# Patient Record
Sex: MALE | Race: ASIAN | NOT HISPANIC OR LATINO | URBAN - METROPOLITAN AREA
[De-identification: names, ages, dates, MRNs, and addresses within clinical notes are randomized per-mention and may not be internally consistent; named-entity substitution may affect disease eponyms.]

---

## 2024-06-17 LAB — HBA1C MFR BLD HPLC: 5.3 %

## 2024-07-08 ENCOUNTER — OFFICE VISIT (OUTPATIENT)
Dept: GASTROENTEROLOGY | Facility: CLINIC | Age: 44
End: 2024-07-08
Payer: COMMERCIAL

## 2024-07-08 ENCOUNTER — TELEPHONE (OUTPATIENT)
Dept: GASTROENTEROLOGY | Facility: CLINIC | Age: 44
End: 2024-07-08

## 2024-07-08 VITALS
WEIGHT: 172 LBS | SYSTOLIC BLOOD PRESSURE: 120 MMHG | HEIGHT: 65 IN | BODY MASS INDEX: 28.66 KG/M2 | DIASTOLIC BLOOD PRESSURE: 78 MMHG

## 2024-07-08 DIAGNOSIS — K21.9 GASTROESOPHAGEAL REFLUX DISEASE, UNSPECIFIED WHETHER ESOPHAGITIS PRESENT: Primary | ICD-10-CM

## 2024-07-08 PROCEDURE — 99203 OFFICE O/P NEW LOW 30 MIN: CPT | Performed by: NURSE PRACTITIONER

## 2024-07-08 RX ORDER — FAMOTIDINE 40 MG/1
40 TABLET, FILM COATED ORAL
Qty: 30 TABLET | Refills: 6 | Status: SHIPPED | OUTPATIENT
Start: 2024-07-08

## 2024-07-08 RX ORDER — PANTOPRAZOLE SODIUM 20 MG/1
20 TABLET, DELAYED RELEASE ORAL DAILY
COMMUNITY
End: 2024-07-08 | Stop reason: ALTCHOICE

## 2024-07-08 RX ORDER — OMEPRAZOLE 40 MG/1
40 CAPSULE, DELAYED RELEASE ORAL
Qty: 60 CAPSULE | Refills: 6 | Status: SHIPPED | OUTPATIENT
Start: 2024-07-08

## 2024-07-08 NOTE — H&P (VIEW-ONLY)
[facility] - Outpatient Consultation  Jose Reyes 43 y.o. male MRN: [id number]  Encounter: [id number]    ASSESSMENT AND PLAN:      1. Gastroesophageal reflux disease, unspecified whether esophagitis present  Patient states he has been having increased acid reflux symptoms for a number of years.  Patient states he has done numerous over-the-counter medications.  He states he has also done omeprazole 20 mg without relief and switch to pantoprazole 20 mg daily by PCP.  Patient notes increased sour taste in his mouth no matter what his dietary discretion has been.  Does note epigastric discomfort with palpation.  Patient does also note nocturnal acid reflux symptoms and states he makes every attempt to not eat 2 to 3 hours prior to bedtime.  Consider peptic versus gastric ulcer, GERD, hiatal hernia.  Maximum suppression for acid reflux and will reevaluate after procedure.     -Monitor for food triggers  -No eating 2 to 3 hours prior to bedtime  -Elevation head of bed  - omeprazole (PriLOSEC) 40 MG capsule; Take 1 capsule (40 mg total) by mouth 2 (two) times a day before meals  Dispense: 60 capsule; Refill: 6  - famotidine (PEPCID) 40 MG tablet; Take 1 tablet (40 mg total) by mouth daily at bedtime  Dispense: 30 tablet; Refill: 6    -Monitor for food triggers  - EGD scheduled at [facility]      Followup Appointment: After EGD with Dr. Templeton  ______________________________________________________________________    Chief Complaint   Patient presents with    GERD     Also has a sour taste in his mouth         HPI:   43-year-old male with no significant past medical history presents with long time complaint of symptoms of acid reflux, sour taste in his mouth and epigastric discomfort.  Patient states he has tried many over-the-counter medications.  States that he had also taken omeprazole 20 mg and pantoprazole 20 mg as prescribed by his PCP without relief.  Patient states he has adjusted  "his diet however feels it has not resolved any of his symptoms.  On exam, he denies nausea or vomiting however does note epigastric tenderness with palpation.  States he is having daily normal bowel movements.  Denies hematochezia or melena.  States his weight is stable.  Non-smoker, denies EtOH use.  Has had recent lab work noting negative H. pylori testing, CBC, CMP normal, lipids noted slightly elevated triglycerides.    Historical Information   Past Medical History:   Diagnosis Date    GERD (gastroesophageal reflux disease)      Past Surgical History:   Procedure Laterality Date    KNEE SURGERY      SINUS SURGERY       Social History     Substance and Sexual Activity   Alcohol Use Not Currently     Social History     Substance and Sexual Activity   Drug Use Not on file     Social History     Tobacco Use   Smoking Status Never   Smokeless Tobacco Never     Family History   Problem Relation Age of Onset    Prostate cancer Father     Colon cancer Neg Hx     Colon polyps Neg Hx        Meds/Allergies     Current Outpatient Medications:     famotidine (PEPCID) 40 MG tablet    omeprazole (PriLOSEC) 40 MG capsule    No Known Allergies    PHYSICAL EXAM:    Blood pressure 120/78, height 5' 5\" (1.651 m), weight 78 kg (172 lb). Body mass index is 28.62 kg/m².    General Appearance: NAD, cooperative, alert  Eyes: Anicteric, PERRLA, EOMI  ENT:  Normocephalic, atraumatic, normal mucosa.    Neck:  Supple, symmetrical, trachea midline,   Resp:  Clear to auscultation bilaterally; no rales, rhonchi or wheezing; respirations unlabored   CV:  S1 S2, Regular rate and rhythm; no murmur, rub, or gallop.  GI:  Soft, epigastric discomfort with palpation, non-distended; normal bowel sounds; no masses, no organomegaly   Rectal: Deferred  Musculoskeletal: No cyanosis, clubbing or edema. Normal ROM.  Skin:  No jaundice, rashes, or lesions   Heme/Lymph: No palpable cervical lymphadenopathy  Psych: Normal affect, good eye contact  Neuro: No " "gross deficits, AAOx3    Lab Results:   No results found for: \"WBC\", \"HGB\", \"HCT\", \"MCV\", \"PLT\"  No results found for: \"NA\", \"K\", \"CL\", \"CO2\", \"ANIONGAP\", \"BUN\", \"CREATININE\", \"GLUCOSE\", \"GLUF\", \"CALCIUM\", \"CORRECTEDCA\", \"AST\", \"ALT\", \"ALKPHOS\", \"PROT\", \"BILITOT\", \"EGFR\"  No results found for: \"IRON\", \"TIBC\", \"FERRITIN\"  No results found for: \"LIPASE\"    Radiology Results:   No results found.      REVIEW OF SYSTEMS:    CONSTITUTIONAL: Denies any fever, chills, rigors, and weight loss.  HEENT: No earache or tinnitus. Denies hearing loss or visual disturbances.  CARDIOVASCULAR: No chest pain or palpitations.   RESPIRATORY: Denies any cough, hemoptysis, shortness of breath or dyspnea on exertion.  GASTROINTESTINAL: As noted in the History of Present Illness.   GENITOURINARY: No problems with urination. Denies any hematuria or dysuria.  NEUROLOGIC: No dizziness or vertigo, denies headaches.   MUSCULOSKELETAL: Denies any muscle or joint pain.   SKIN: Denies skin rashes or itching.   ENDOCRINE: Denies excessive thirst. Denies intolerance to heat or cold.  PSYCHOSOCIAL: Denies depression or anxiety. Denies any recent memory loss.   "

## 2024-07-08 NOTE — TELEPHONE ENCOUNTER
Scheduled date of EGD(as of today): 7/25/24  Physician performing EGD: ANNE  Location of EGD: [facility]  Instructions reviewed with patient by: MANJULA  Clearances: N

## 2024-07-08 NOTE — PROGRESS NOTES
[facility] - Outpatient Consultation  Jose Reyes 43 y.o. male MRN: [id number]  Encounter: [id number]    ASSESSMENT AND PLAN:      1. Gastroesophageal reflux disease, unspecified whether esophagitis present  Patient states he has been having increased acid reflux symptoms for a number of years.  Patient states he has done numerous over-the-counter medications.  He states he has also done omeprazole 20 mg without relief and switch to pantoprazole 20 mg daily by PCP.  Patient notes increased sour taste in his mouth no matter what his dietary discretion has been.  Does note epigastric discomfort with palpation.  Patient does also note nocturnal acid reflux symptoms and states he makes every attempt to not eat 2 to 3 hours prior to bedtime.  Consider peptic versus gastric ulcer, GERD, hiatal hernia.  Maximum suppression for acid reflux and will reevaluate after procedure.     -Monitor for food triggers  -No eating 2 to 3 hours prior to bedtime  -Elevation head of bed  - omeprazole (PriLOSEC) 40 MG capsule; Take 1 capsule (40 mg total) by mouth 2 (two) times a day before meals  Dispense: 60 capsule; Refill: 6  - famotidine (PEPCID) 40 MG tablet; Take 1 tablet (40 mg total) by mouth daily at bedtime  Dispense: 30 tablet; Refill: 6    -Monitor for food triggers  - EGD scheduled at [facility]      Followup Appointment: After EGD with Dr. Templeton  ______________________________________________________________________    Chief Complaint   Patient presents with    GERD     Also has a sour taste in his mouth         HPI:   43-year-old male with no significant past medical history presents with long time complaint of symptoms of acid reflux, sour taste in his mouth and epigastric discomfort.  Patient states he has tried many over-the-counter medications.  States that he had also taken omeprazole 20 mg and pantoprazole 20 mg as prescribed by his PCP without relief.  Patient states he has adjusted  "his diet however feels it has not resolved any of his symptoms.  On exam, he denies nausea or vomiting however does note epigastric tenderness with palpation.  States he is having daily normal bowel movements.  Denies hematochezia or melena.  States his weight is stable.  Non-smoker, denies EtOH use.  Has had recent lab work noting negative H. pylori testing, CBC, CMP normal, lipids noted slightly elevated triglycerides.    Historical Information   Past Medical History:   Diagnosis Date    GERD (gastroesophageal reflux disease)      Past Surgical History:   Procedure Laterality Date    KNEE SURGERY      SINUS SURGERY       Social History     Substance and Sexual Activity   Alcohol Use Not Currently     Social History     Substance and Sexual Activity   Drug Use Not on file     Social History     Tobacco Use   Smoking Status Never   Smokeless Tobacco Never     Family History   Problem Relation Age of Onset    Prostate cancer Father     Colon cancer Neg Hx     Colon polyps Neg Hx        Meds/Allergies     Current Outpatient Medications:     famotidine (PEPCID) 40 MG tablet    omeprazole (PriLOSEC) 40 MG capsule    No Known Allergies    PHYSICAL EXAM:    Blood pressure 120/78, height 5' 5\" (1.651 m), weight 78 kg (172 lb). Body mass index is 28.62 kg/m².    General Appearance: NAD, cooperative, alert  Eyes: Anicteric, PERRLA, EOMI  ENT:  Normocephalic, atraumatic, normal mucosa.    Neck:  Supple, symmetrical, trachea midline,   Resp:  Clear to auscultation bilaterally; no rales, rhonchi or wheezing; respirations unlabored   CV:  S1 S2, Regular rate and rhythm; no murmur, rub, or gallop.  GI:  Soft, epigastric discomfort with palpation, non-distended; normal bowel sounds; no masses, no organomegaly   Rectal: Deferred  Musculoskeletal: No cyanosis, clubbing or edema. Normal ROM.  Skin:  No jaundice, rashes, or lesions   Heme/Lymph: No palpable cervical lymphadenopathy  Psych: Normal affect, good eye contact  Neuro: No " "gross deficits, AAOx3    Lab Results:   No results found for: \"WBC\", \"HGB\", \"HCT\", \"MCV\", \"PLT\"  No results found for: \"NA\", \"K\", \"CL\", \"CO2\", \"ANIONGAP\", \"BUN\", \"CREATININE\", \"GLUCOSE\", \"GLUF\", \"CALCIUM\", \"CORRECTEDCA\", \"AST\", \"ALT\", \"ALKPHOS\", \"PROT\", \"BILITOT\", \"EGFR\"  No results found for: \"IRON\", \"TIBC\", \"FERRITIN\"  No results found for: \"LIPASE\"    Radiology Results:   No results found.      REVIEW OF SYSTEMS:    CONSTITUTIONAL: Denies any fever, chills, rigors, and weight loss.  HEENT: No earache or tinnitus. Denies hearing loss or visual disturbances.  CARDIOVASCULAR: No chest pain or palpitations.   RESPIRATORY: Denies any cough, hemoptysis, shortness of breath or dyspnea on exertion.  GASTROINTESTINAL: As noted in the History of Present Illness.   GENITOURINARY: No problems with urination. Denies any hematuria or dysuria.  NEUROLOGIC: No dizziness or vertigo, denies headaches.   MUSCULOSKELETAL: Denies any muscle or joint pain.   SKIN: Denies skin rashes or itching.   ENDOCRINE: Denies excessive thirst. Denies intolerance to heat or cold.  PSYCHOSOCIAL: Denies depression or anxiety. Denies any recent memory loss.   "

## 2024-07-11 ENCOUNTER — ANESTHESIA (OUTPATIENT)
Dept: ANESTHESIOLOGY | Facility: AMBULATORY SURGERY CENTER | Age: 44
End: 2024-07-11

## 2024-07-11 ENCOUNTER — ANESTHESIA EVENT (OUTPATIENT)
Dept: ANESTHESIOLOGY | Facility: AMBULATORY SURGERY CENTER | Age: 44
End: 2024-07-11

## 2024-07-17 ENCOUNTER — TELEPHONE (OUTPATIENT)
Dept: GASTROENTEROLOGY | Facility: CLINIC | Age: 44
End: 2024-07-17

## 2024-07-23 ENCOUNTER — TELEPHONE (OUTPATIENT)
Dept: GASTROENTEROLOGY | Facility: AMBULATORY SURGERY CENTER | Age: 44
End: 2024-07-23

## 2024-07-25 ENCOUNTER — HOSPITAL ENCOUNTER (OUTPATIENT)
Dept: GASTROENTEROLOGY | Facility: AMBULATORY SURGERY CENTER | Age: 44
Discharge: HOME | End: 2024-07-25
Payer: COMMERCIAL

## 2024-07-25 ENCOUNTER — ANESTHESIA EVENT (OUTPATIENT)
Dept: GASTROENTEROLOGY | Facility: AMBULATORY SURGERY CENTER | Age: 44
End: 2024-07-25

## 2024-07-25 ENCOUNTER — ANESTHESIA (OUTPATIENT)
Dept: GASTROENTEROLOGY | Facility: AMBULATORY SURGERY CENTER | Age: 44
End: 2024-07-25

## 2024-07-25 VITALS
SYSTOLIC BLOOD PRESSURE: 99 MMHG | WEIGHT: 172 LBS | DIASTOLIC BLOOD PRESSURE: 52 MMHG | OXYGEN SATURATION: 95 % | TEMPERATURE: 97.8 F | HEART RATE: 65 BPM | RESPIRATION RATE: 15 BRPM | BODY MASS INDEX: 28.66 KG/M2 | HEIGHT: 65 IN

## 2024-07-25 DIAGNOSIS — K21.9 GASTROESOPHAGEAL REFLUX DISEASE, UNSPECIFIED WHETHER ESOPHAGITIS PRESENT: ICD-10-CM

## 2024-07-25 PROCEDURE — 88305 TISSUE EXAM BY PATHOLOGIST: CPT | Performed by: STUDENT IN AN ORGANIZED HEALTH CARE EDUCATION/TRAINING PROGRAM

## 2024-07-25 PROCEDURE — 43239 EGD BIOPSY SINGLE/MULTIPLE: CPT | Performed by: INTERNAL MEDICINE

## 2024-07-25 RX ORDER — PROPOFOL 10 MG/ML
INJECTION, EMULSION INTRAVENOUS AS NEEDED
Status: DISCONTINUED | OUTPATIENT
Start: 2024-07-25 | End: 2024-07-25

## 2024-07-25 RX ORDER — SODIUM CHLORIDE, SODIUM LACTATE, POTASSIUM CHLORIDE, CALCIUM CHLORIDE 600; 310; 30; 20 MG/100ML; MG/100ML; MG/100ML; MG/100ML
50 INJECTION, SOLUTION INTRAVENOUS CONTINUOUS
Status: DISCONTINUED | OUTPATIENT
Start: 2024-07-25 | End: 2024-07-25

## 2024-07-25 RX ORDER — LIDOCAINE HYDROCHLORIDE 10 MG/ML
INJECTION, SOLUTION EPIDURAL; INFILTRATION; INTRACAUDAL; PERINEURAL AS NEEDED
Status: DISCONTINUED | OUTPATIENT
Start: 2024-07-25 | End: 2024-07-25

## 2024-07-25 RX ADMIN — PROPOFOL 130 MG: 10 INJECTION, EMULSION INTRAVENOUS at 14:45

## 2024-07-25 RX ADMIN — PROPOFOL 50 MG: 10 INJECTION, EMULSION INTRAVENOUS at 14:47

## 2024-07-25 RX ADMIN — PROPOFOL 50 MG: 10 INJECTION, EMULSION INTRAVENOUS at 14:48

## 2024-07-25 RX ADMIN — LIDOCAINE HYDROCHLORIDE 50 MG: 10 INJECTION, SOLUTION EPIDURAL; INFILTRATION; INTRACAUDAL; PERINEURAL at 14:45

## 2024-07-25 RX ADMIN — SODIUM CHLORIDE, SODIUM LACTATE, POTASSIUM CHLORIDE, CALCIUM CHLORIDE 50 ML/HR: 600; 310; 30; 20 INJECTION, SOLUTION INTRAVENOUS at 14:37

## 2024-07-25 NOTE — ANESTHESIA PREPROCEDURE EVALUATION
Procedure:  EGD    Relevant Problems   ANESTHESIA (within normal limits)      GI/HEPATIC   (+) GERD (gastroesophageal reflux disease)        Physical Exam    Airway    Mallampati score: I  TM Distance: >3 FB  Neck ROM: full     Dental   No notable dental hx     Cardiovascular  Cardiovascular exam normal    Pulmonary  Pulmonary exam normal     Other Findings        Anesthesia Plan  ASA Score- 1     Anesthesia Type- IV sedation with anesthesia with ASA Monitors.         Additional Monitors:     Airway Plan:     Comment: I discussed risks (reviewed with patient on the anesthesia consent form), benefits and alternatives of monitored sedation including the possibility under sedation to have recall or mild discomfort.  .       Plan Factors-    Chart reviewed.    Patient summary reviewed.                  Induction- intravenous.    Postoperative Plan-         Informed Consent- Anesthetic plan and risks discussed with patient.  I personally reviewed this patient with the CRNA. Discussed and agreed on the Anesthesia Plan with the CRNA..

## 2024-07-25 NOTE — INTERVAL H&P NOTE
H&P reviewed. After examining the patient I find no changes in the patients condition since the H&P had been written.    Vitals:    07/25/24 1430   BP: 111/62   Pulse: 67   Resp: 20   Temp: 97.8 °F (36.6 °C)   SpO2: 94%

## 2024-07-25 NOTE — ANESTHESIA POSTPROCEDURE EVALUATION
Post-Op Assessment Note    CV Status:  Stable  Pain Score: 0    Pain management: adequate       Mental Status:  Alert, awake and sleepy   Hydration Status:  Euvolemic   PONV Controlled:  Controlled   Airway Patency:  Patent     Post Op Vitals Reviewed: Yes    No anethesia notable event occurred.    Staff: CRNA               BP   97/52   Temp   97.6   Pulse  66   Resp   18   SpO2   98

## 2024-07-29 PROCEDURE — 88305 TISSUE EXAM BY PATHOLOGIST: CPT | Performed by: STUDENT IN AN ORGANIZED HEALTH CARE EDUCATION/TRAINING PROGRAM

## 2024-08-15 ENCOUNTER — OFFICE VISIT (OUTPATIENT)
Dept: GASTROENTEROLOGY | Facility: CLINIC | Age: 44
End: 2024-08-15
Payer: COMMERCIAL

## 2024-08-15 VITALS
HEIGHT: 65 IN | DIASTOLIC BLOOD PRESSURE: 78 MMHG | SYSTOLIC BLOOD PRESSURE: 110 MMHG | WEIGHT: 172 LBS | BODY MASS INDEX: 28.66 KG/M2

## 2024-08-15 DIAGNOSIS — R12 HEARTBURN: Primary | ICD-10-CM

## 2024-08-15 PROCEDURE — 99214 OFFICE O/P EST MOD 30 MIN: CPT | Performed by: INTERNAL MEDICINE

## 2024-08-15 RX ORDER — RABEPRAZOLE SODIUM 20 MG/1
20 TABLET, DELAYED RELEASE ORAL 2 TIMES DAILY
Qty: 180 TABLET | Refills: 0 | Status: SHIPPED | OUTPATIENT
Start: 2024-08-15

## 2024-08-15 NOTE — PROGRESS NOTES
[facility] - Outpatient Follow-up Note  Jose Reyes 43 y.o. male MRN: [id number]  Encounter: [id number]    ASSESSMENT AND PLAN:      1. Heartburn  He does have the typical symptoms of heartburn and regurgitation simply not responding at all to his medications.  I will switch him over to rabeprazole, the most potent PPI.  He is interested in potential antireflux surgery, so I will proceed with esophageal manometry to ensure that his motility is intact as well as 24-hour pH impedance to confirm that his breakthrough symptoms are indeed reflux related  - RABEprazole (ACIPHEX) 20 MG tablet; Take 1 tablet (20 mg total) by mouth 2 (two) times a day  Dispense: 180 tablet; Refill: 0  - Esoph manometry/24hr ph; Future      Follow up appointment: For manometry and pH impedance    _______________________      Chief Complaint   Patient presents with    Follow-up     Pt is still experiencing burning sensation in his throat and stomach, sour taste in his mouth. Occasionally feels nauseous after eating, vomits phlegm. Medications are not helping.       HPI:   Patient is a 43 y.o. male with no significant past medical history presenting for follow up regarding continued issues with reflux.  He has breakthrough heartburn and regurgitation despite omeprazole 40 mg twice daily and famotidine 40 mg at bedtime.  He continues to deny dysphagia and odynophagia.  Recent endoscopy was normal including biopsies.  He denies early satiety and vomiting.  He does not think the medications are helping at all.  He is interested in antireflux surgery should he be a candidate    Historical Information   Past Medical History:   Diagnosis Date    GERD (gastroesophageal reflux disease)      Past Surgical History:   Procedure Laterality Date    KNEE SURGERY      SINUS SURGERY      UPPER GASTROINTESTINAL ENDOSCOPY  07/2024     Social History     Substance and Sexual Activity   Alcohol Use Not Currently     Social  "History     Substance and Sexual Activity   Drug Use Never     Social History     Tobacco Use   Smoking Status Never    Passive exposure: Never   Smokeless Tobacco Never     Family History   Problem Relation Age of Onset    Prostate cancer Father     Cancer Father         Prostate    Diabetes Maternal Grandmother     Colon cancer Neg Hx     Colon polyps Neg Hx          Current Outpatient Medications:     RABEprazole (ACIPHEX) 20 MG tablet  No Known Allergies  Reviewed medications and allergies and updated as indicated    PHYSICAL EXAM:    Blood pressure 110/78, height 5' 5\" (1.651 m), weight 78 kg (172 lb). Body mass index is 28.62 kg/m².  General Appearance: NAD, cooperative, alert  Eyes: Anicteric  GI:  Soft, non-tender, non-distended; normal bowel sounds; no masses, no organomegaly   Rectal: Deferred  Musculoskeletal: No edema.  Skin:  No jaundice    Lab Results:   No results found for: \"WBC\", \"HGB\", \"MCV\", \"PLT\"  No results found for: \"NA\", \"K\", \"CL\", \"CO2\", \"ANIONGAP\", \"BUN\", \"CREATININE\", \"GLUCOSE\", \"GLUF\", \"CALCIUM\", \"CORRECTEDCA\", \"AST\", \"ALT\", \"ALKPHOS\", \"PROT\", \"BILITOT\", \"EGFR\"  No results found for: \"IRON\", \"TIBC\", \"FERRITIN\"  No results found for: \"LIPASE\"    Radiology Results:   EGD    Result Date: 7/25/2024  Narrative: Table formatting from the original result was not included. [facility] [address] [place] [id number] [phone] DATE OF SERVICE: 7/25/24 PHYSICIAN(S): Attending: Kareem Templeton DO Fellow: No Staff Documented INDICATION: Gastroesophageal reflux disease, unspecified whether esophagitis present POST-OP DIAGNOSIS: See the impression below. PREPROCEDURE: Informed consent was obtained for the procedure, including sedation.  Risks of perforation, hemorrhage, adverse drug reaction and aspiration were discussed. The patient was placed in the left lateral decubitus position. Patient was explained about the risks and benefits of the " procedure. Risks including but not limited to bleeding, infection, and perforation were explained in detail. Also explained about less than 100% sensitivity with the exam and other alternatives. PROCEDURE: EGD DETAILS OF PROCEDURE: Patient was taken to the procedure room where a time out was performed to confirm correct patient and correct procedure. The patient underwent monitored anesthesia care, which was administered by an anesthesia professional. The patient's blood pressure, ECG and oxygen were monitored throughout the procedure. The scope was introduced through the mouth and advanced to the second part of the duodenum. Retroflexion was performed in the cardia, fundus and incisura. Prior to the procedure, the patient's H. Pylori status was unknown. The patient experienced no blood loss. The procedure was not difficult. The patient tolerated the procedure well. There were no apparent adverse events. ANESTHESIA INFORMATION: ASA: I Anesthesia Type: IV Sedation with Anesthesia MEDICATIONS: No administrations occurring from 1442 to 1450 on 07/25/24 FINDINGS: All observed locations appeared normal. Performed random biopsy using biopsy forceps to rule out H. pylori. SPECIMENS: * No specimens in log *     Impression: Normal. Performed random biopsy to rule out H. pylori. RECOMMENDATION: Await pathology results   Kareem Templeton DO

## 2024-08-16 ENCOUNTER — TELEPHONE (OUTPATIENT)
Dept: GASTROENTEROLOGY | Facility: AMBULATORY SURGERY CENTER | Age: 44
End: 2024-08-16

## 2024-08-20 ENCOUNTER — HOSPITAL ENCOUNTER (OUTPATIENT)
Dept: GASTROENTEROLOGY | Facility: AMBULATORY SURGERY CENTER | Age: 44
Discharge: HOME | End: 2024-08-20
Attending: INTERNAL MEDICINE
Payer: COMMERCIAL

## 2024-08-20 VITALS
RESPIRATION RATE: 15 BRPM | OXYGEN SATURATION: 100 % | SYSTOLIC BLOOD PRESSURE: 121 MMHG | DIASTOLIC BLOOD PRESSURE: 78 MMHG | HEART RATE: 65 BPM | TEMPERATURE: 98 F

## 2024-08-20 DIAGNOSIS — R12 HEARTBURN: ICD-10-CM

## 2024-08-20 PROCEDURE — 91010 ESOPHAGUS MOTILITY STUDY: CPT | Performed by: INTERNAL MEDICINE

## 2024-08-20 PROCEDURE — 91038 ESOPH IMPED FUNCT TEST > 1HR: CPT | Performed by: INTERNAL MEDICINE

## 2024-08-20 NOTE — PERIOPERATIVE NURSING NOTE
Patient brought in the room and explained the esophageal manometry procedure. After the confirmation of allergies, Lidocaine 2% viscous solution given via right/ left nostrils and  a transnasal insertion of the High Resolution esophageal manometry catheter was inserted via left nostril. Patient given water to drink during the insertion and once the catheter inserted pressure bands of both Upper esophageal sphincter  (UES) and Lower esophageal sphincter ( LES) were observed on the color contour. Patient instructed to take a deep breath to verify placement of the catheter, diaphragmatic pinch noted on inspiration. Catheter was secured to left cheek. Patient was assisted to supine position .Patient was instructed to relax  while acclimating the catheter for about 5 minutes. A 30 second baseline resting pressure was obtained to identify the UES and LES followed by a series of 10 liquid swallows using 5 cc room temperature normal saline to assess esophageal motility and bolus transit. Patient administered 10 viscous swallows using 5 cc viscous solution, 1 multiple rapid drink swallow using 2 cc room temperature normal saline given a total of 5 drinks. Patient instructed to sit up at the edge of the stretcher and given 5 upright liquid swallows using 5 cc room temperature normal saline and 1 rapid drink challenge using 200 cc room temperature water. At the end of the procedure the high resolution esophageal manometry catheter was removed from the nostril intact. Dual sensor PH probe inserted via left nostril and secured. Zephr recorder teachback performed and patient verbalized understanding. Patient instructed to return next day to have probe remove. Discharge instructions given and patient ambulated out of room in stable condition.

## 2024-08-23 PROCEDURE — 91038 ESOPH IMPED FUNCT TEST > 1HR: CPT | Performed by: INTERNAL MEDICINE

## 2024-08-23 PROCEDURE — 91010 ESOPHAGUS MOTILITY STUDY: CPT | Performed by: INTERNAL MEDICINE

## 2024-09-12 ENCOUNTER — TELEPHONE (OUTPATIENT)
Age: 44
End: 2024-09-12

## 2024-09-12 ENCOUNTER — NURSE TRIAGE (OUTPATIENT)
Age: 44
End: 2024-09-12

## 2024-09-12 NOTE — TELEPHONE ENCOUNTER
Pt called stated to schedule an appt. Appt is scheduled for 10- and would like to speak with someone regarding severe heartburn and reflux. Warm transferred to gi nurses for further assistance

## 2024-09-12 NOTE — TELEPHONE ENCOUNTER
Pt transferred to nurses line.     Spoke with pt via language line . He received 24 hr pH study results and manometry results today and was told it was normal but still having ongoing symptoms of heartburn. He is asking if results are normal what is he experiencing/ diagnosis and what the next steps are for him. He is having symptoms of heart burn and regurgitation and no medications are helping him.     Pt is not taking the aciphex as it is not covered by insurance and cost was $300.   He is currently on omeprazole.     OV scheduled 10/31/24         Reason for Disposition   The patient is on PPI once a day with continued epigastric discomfort, reflux, regurgitation    Protocols used: GERD

## 2024-09-17 DIAGNOSIS — K21.9 GASTROESOPHAGEAL REFLUX DISEASE, UNSPECIFIED WHETHER ESOPHAGITIS PRESENT: Primary | ICD-10-CM

## 2024-09-17 NOTE — TELEPHONE ENCOUNTER
Pt. Called back, reviewed recommendation from Dr. Templeton, pt. Interested in discussing further with surgeon regarding anti-reflux procedure, pt. Symptoms have no been controlled with medication, encouraged pt. To follow a strict bland diet and continue taking medications, pt. Would like a referral to surgery to discuss, next OV with GI in October

## 2024-09-24 NOTE — H&P (VIEW-ONLY)
Thoracic Consult  Assessment/Plan:    GERD (gastroesophageal reflux disease)  44yM w/ GERD. pH testing done on Aciphex but with symptom and reflux correlation.     His BMI is 28.6. He is a non-smoker. We discussed surgery versus further medical management.     Discussed the risks, benefits and alternatives of the surgical repair of a hiatal hernia repair with partial fundoplication. Risks include bleeding, infection, dysphagia, injury to the vagal nerves, injury to the esophagus or another viscera, and recurrence. We discussed the typical postoperative course. Most patients are discharged home the following day after surgery on a full liquid diet for a few days. After this a soft diet can be started using our Diet Sheet Recommendations. Consent was obtained in the office today for a EGD, robotic-assisted hiatal hernia reduction with partial fundoplication, possible Biologic mesh implantation, and possible gastroplasty.         Diagnoses and all orders for this visit:    Gastroesophageal reflux disease without esophagitis          I have spent a total time of 56 minutes in caring for this patient on the day of the visit/encounter including Diagnostic results, Prognosis, Risks and benefits of tx options, Instructions for management, Patient and family education, Importance of tx compliance, Risk factor reductions, Impressions, Counseling / Coordination of care, Documenting in the medical record, Reviewing / ordering tests, medicine, procedures  , and Obtaining or reviewing history  .     A  was used and on standby.     Thoracic History      Subjective:    Patient ID: Jose Reyes is a 44 y.o. male referred for evaluation of GERD. His medical history only includes a knee surgery and sinus surgery.     He states that he has had reflux pain for at least a few years. The medications used to help but no longer help. He continues to complain of severe substernal pain. He sleeps with pillows but still has  reflux pain. Limits eating before bed. He has no dysphagia but sometimes has nausea. He has an occasional cough.     Data:  The following results contain my personal interpretation and summarization.   pH Study (8/20/24): On PPI. Good correlation between reflux events and symptoms.   Manometry (8/20/24): Nml motility  EGD (7/24/24): No hiatal hernia or esophagitis described.     Meds: Aciphex     The following portions of the patient's history were reviewed and updated as appropriate: allergies, current medications, past family history, past medical history, past social history, past surgical history, and problem list.    Past Medical History:   Diagnosis Date    GERD (gastroesophageal reflux disease)       Past Surgical History:   Procedure Laterality Date    KNEE SURGERY      SINUS SURGERY      UPPER GASTROINTESTINAL ENDOSCOPY  07/2024      Family History   Problem Relation Age of Onset    Prostate cancer Father     Cancer Father         Prostate    Diabetes Maternal Grandmother     Colon cancer Neg Hx     Colon polyps Neg Hx       Social History     Socioeconomic History    Marital status:      Spouse name: Not on file    Number of children: Not on file    Years of education: Not on file    Highest education level: Not on file   Occupational History    Not on file   Tobacco Use    Smoking status: Never     Passive exposure: Never    Smokeless tobacco: Never   Vaping Use    Vaping status: Never Used   Substance and Sexual Activity    Alcohol use: Not Currently    Drug use: Never    Sexual activity: Yes     Partners: Male   Other Topics Concern    Not on file   Social History Narrative    Not on file     Social Determinants of Health     Financial Resource Strain: Not on file   Food Insecurity: Not on file   Transportation Needs: Not on file   Physical Activity: Not on file   Stress: Not on file   Social Connections: Not on file   Intimate Partner Violence: Not on file   Housing Stability: Not on file       Review of Systems   Constitutional:  Negative for chills, fatigue and fever.   HENT:  Negative for trouble swallowing and voice change.    Eyes:  Negative for photophobia and visual disturbance.   Respiratory:  Positive for cough. Negative for chest tightness and shortness of breath.    Cardiovascular:  Negative for chest pain and palpitations.   Gastrointestinal:  Positive for nausea. Negative for abdominal pain and vomiting.        Substernal pain.   Skin:  Negative for rash and wound.   Neurological:  Negative for dizziness, weakness and light-headedness.   All other systems reviewed and are negative.        Objective:   Physical Exam  Vitals reviewed.   Constitutional:       Appearance: Normal appearance. He is normal weight.   HENT:      Head: Normocephalic and atraumatic.   Cardiovascular:      Rate and Rhythm: Normal rate and regular rhythm.      Pulses: Normal pulses.      Heart sounds: Normal heart sounds.   Pulmonary:      Effort: Pulmonary effort is normal.      Breath sounds: Normal breath sounds.   Abdominal:      General: Abdomen is flat. There is no distension.      Palpations: Abdomen is soft.      Tenderness: There is no abdominal tenderness.   Musculoskeletal:      Right lower leg: No edema.      Left lower leg: No edema.   Neurological:      General: No focal deficit present.      Mental Status: He is alert and oriented to person, place, and time.   Psychiatric:         Mood and Affect: Mood normal.         Behavior: Behavior normal.         Thought Content: Thought content normal.         Judgment: Judgment normal.     There were no vitals taken for this visit.    No Chest XR results available for this patient.   No CT Chest results available for this patient.  No CT Chest,Abdomen,Pelvis results available for this patient.   No NM PET CT results available for this patient.   No Barium Swallow results available for this patient.

## 2024-09-24 NOTE — ASSESSMENT & PLAN NOTE
44yM w/ GERD. pH testing done on Aciphex but with symptom and reflux correlation.     His BMI is 28.6. He is a non-smoker. We discussed surgery versus further medical management.     Discussed the risks, benefits and alternatives of the surgical repair of a hiatal hernia repair with partial fundoplication. Risks include bleeding, infection, dysphagia, injury to the vagal nerves, injury to the esophagus or another viscera, and recurrence. We discussed the typical postoperative course. Most patients are discharged home the following day after surgery on a full liquid diet for a few days. After this a soft diet can be started using our Diet Sheet Recommendations. Consent was obtained in the office today for a EGD, robotic-assisted hiatal hernia reduction with partial fundoplication, possible Biologic mesh implantation, and possible gastroplasty.

## 2024-09-24 NOTE — PROGRESS NOTES
Discussed AREDS 2 supplements, UV protection and green leafy vegetables. Thoracic Consult  Assessment/Plan:    GERD (gastroesophageal reflux disease)  44yM w/ GERD. pH testing done on Aciphex but with symptom and reflux correlation.     His BMI is 28.6. He is a non-smoker. We discussed surgery versus further medical management.     Discussed the risks, benefits and alternatives of the surgical repair of a hiatal hernia repair with partial fundoplication. Risks include bleeding, infection, dysphagia, injury to the vagal nerves, injury to the esophagus or another viscera, and recurrence. We discussed the typical postoperative course. Most patients are discharged home the following day after surgery on a full liquid diet for a few days. After this a soft diet can be started using our Diet Sheet Recommendations. Consent was obtained in the office today for a EGD, robotic-assisted hiatal hernia reduction with partial fundoplication, possible Biologic mesh implantation, and possible gastroplasty.         Diagnoses and all orders for this visit:    Gastroesophageal reflux disease without esophagitis          I have spent a total time of 56 minutes in caring for this patient on the day of the visit/encounter including Diagnostic results, Prognosis, Risks and benefits of tx options, Instructions for management, Patient and family education, Importance of tx compliance, Risk factor reductions, Impressions, Counseling / Coordination of care, Documenting in the medical record, Reviewing / ordering tests, medicine, procedures  , and Obtaining or reviewing history  .     A  was used and on standby.     Thoracic History      Subjective:    Patient ID: Jose Reyes is a 44 y.o. male referred for evaluation of GERD. His medical history only includes a knee surgery and sinus surgery.     He states that he has had reflux pain for at least a few years. The medications used to help but no longer help. He continues to complain of severe substernal pain. He sleeps with pillows but still has  reflux pain. Limits eating before bed. He has no dysphagia but sometimes has nausea. He has an occasional cough.     Data:  The following results contain my personal interpretation and summarization.   pH Study (8/20/24): On PPI. Good correlation between reflux events and symptoms.   Manometry (8/20/24): Nml motility  EGD (7/24/24): No hiatal hernia or esophagitis described.     Meds: Aciphex     The following portions of the patient's history were reviewed and updated as appropriate: allergies, current medications, past family history, past medical history, past social history, past surgical history, and problem list.    Past Medical History:   Diagnosis Date    GERD (gastroesophageal reflux disease)       Past Surgical History:   Procedure Laterality Date    KNEE SURGERY      SINUS SURGERY      UPPER GASTROINTESTINAL ENDOSCOPY  07/2024      Family History   Problem Relation Age of Onset    Prostate cancer Father     Cancer Father         Prostate    Diabetes Maternal Grandmother     Colon cancer Neg Hx     Colon polyps Neg Hx       Social History     Socioeconomic History    Marital status:      Spouse name: Not on file    Number of children: Not on file    Years of education: Not on file    Highest education level: Not on file   Occupational History    Not on file   Tobacco Use    Smoking status: Never     Passive exposure: Never    Smokeless tobacco: Never   Vaping Use    Vaping status: Never Used   Substance and Sexual Activity    Alcohol use: Not Currently    Drug use: Never    Sexual activity: Yes     Partners: Male   Other Topics Concern    Not on file   Social History Narrative    Not on file     Social Determinants of Health     Financial Resource Strain: Not on file   Food Insecurity: Not on file   Transportation Needs: Not on file   Physical Activity: Not on file   Stress: Not on file   Social Connections: Not on file   Intimate Partner Violence: Not on file   Housing Stability: Not on file       Review of Systems   Constitutional:  Negative for chills, fatigue and fever.   HENT:  Negative for trouble swallowing and voice change.    Eyes:  Negative for photophobia and visual disturbance.   Respiratory:  Positive for cough. Negative for chest tightness and shortness of breath.    Cardiovascular:  Negative for chest pain and palpitations.   Gastrointestinal:  Positive for nausea. Negative for abdominal pain and vomiting.        Substernal pain.   Skin:  Negative for rash and wound.   Neurological:  Negative for dizziness, weakness and light-headedness.   All other systems reviewed and are negative.        Objective:   Physical Exam  Vitals reviewed.   Constitutional:       Appearance: Normal appearance. He is normal weight.   HENT:      Head: Normocephalic and atraumatic.   Cardiovascular:      Rate and Rhythm: Normal rate and regular rhythm.      Pulses: Normal pulses.      Heart sounds: Normal heart sounds.   Pulmonary:      Effort: Pulmonary effort is normal.      Breath sounds: Normal breath sounds.   Abdominal:      General: Abdomen is flat. There is no distension.      Palpations: Abdomen is soft.      Tenderness: There is no abdominal tenderness.   Musculoskeletal:      Right lower leg: No edema.      Left lower leg: No edema.   Neurological:      General: No focal deficit present.      Mental Status: He is alert and oriented to person, place, and time.   Psychiatric:         Mood and Affect: Mood normal.         Behavior: Behavior normal.         Thought Content: Thought content normal.         Judgment: Judgment normal.     There were no vitals taken for this visit.    No Chest XR results available for this patient.   No CT Chest results available for this patient.  No CT Chest,Abdomen,Pelvis results available for this patient.   No NM PET CT results available for this patient.   No Barium Swallow results available for this patient.

## 2024-09-25 ENCOUNTER — TELEPHONE (OUTPATIENT)
Dept: CARDIAC SURGERY | Facility: CLINIC | Age: 44
End: 2024-09-25

## 2024-09-25 ENCOUNTER — CONSULT (OUTPATIENT)
Dept: CARDIAC SURGERY | Facility: HOSPITAL | Age: 44
End: 2024-09-25
Payer: COMMERCIAL

## 2024-09-25 VITALS
OXYGEN SATURATION: 98 % | WEIGHT: 169 LBS | TEMPERATURE: 97.9 F | SYSTOLIC BLOOD PRESSURE: 122 MMHG | BODY MASS INDEX: 28.16 KG/M2 | HEART RATE: 70 BPM | HEIGHT: 65 IN | RESPIRATION RATE: 18 BRPM | DIASTOLIC BLOOD PRESSURE: 68 MMHG

## 2024-09-25 DIAGNOSIS — K21.9 GASTROESOPHAGEAL REFLUX DISEASE, UNSPECIFIED WHETHER ESOPHAGITIS PRESENT: ICD-10-CM

## 2024-09-25 DIAGNOSIS — K21.9 GASTROESOPHAGEAL REFLUX DISEASE WITHOUT ESOPHAGITIS: Primary | ICD-10-CM

## 2024-09-25 PROCEDURE — 99205 OFFICE O/P NEW HI 60 MIN: CPT | Performed by: SURGERY

## 2024-09-25 RX ORDER — GABAPENTIN 300 MG/1
600 CAPSULE ORAL ONCE
OUTPATIENT
Start: 2024-09-25 | End: 2024-09-25

## 2024-09-25 RX ORDER — ACETAMINOPHEN 325 MG/1
975 TABLET ORAL ONCE
OUTPATIENT
Start: 2024-09-25 | End: 2024-09-25

## 2024-09-25 RX ORDER — CELECOXIB 100 MG/1
200 CAPSULE ORAL ONCE
OUTPATIENT
Start: 2024-09-25 | End: 2024-09-25

## 2024-09-25 RX ORDER — CEFAZOLIN SODIUM 2 G/50ML
2000 SOLUTION INTRAVENOUS ONCE
OUTPATIENT
Start: 2024-09-25 | End: 2024-09-25

## 2024-09-25 RX ORDER — HEPARIN SODIUM 5000 [USP'U]/ML
5000 INJECTION, SOLUTION INTRAVENOUS; SUBCUTANEOUS
OUTPATIENT
Start: 2024-09-25 | End: 2024-09-26

## 2024-09-25 NOTE — TELEPHONE ENCOUNTER
Patient called and I spoke with him. He questioned how long he will be out of work once he has surgery on 10/4. I advised that BETH Rivas from our office did include this information in his work letter that states he will remain out of work until at least his first post op appointment scheduled for 10/23/24 at 9am with Dr. Medley at the  office. I answered all questions for patient at this time. He knows he can call should he have any other questions.

## 2024-09-25 NOTE — TELEPHONE ENCOUNTER
Letter completed and placed in [employer].  Printed hard copy and placed in mail today.  Will reprint and fax if patient calls back to provide employer's fax number

## 2024-09-25 NOTE — TELEPHONE ENCOUNTER
I called patient and spoke with him. I introduced myself, role and where I am calling from. I reviewed with him that I will be scheduling his surgery with Dr. Medley. We discussed surgery dates, and patient is in agreement to having surgery on Friday, 10/4. I reviewed all surgery instructions and confirmed that he did receive the surgical experience booklet at his appointment today. I advised that this booklet has all information and address for his surgery. I reviewed this information from the booklet with him as well. I instructed patient of when and how to use the pre-surgical soap. In addition I advised that Dr. Medley did order pre-op labs, and provided him locations of where he can have this completed at as a walk in, no appointment necessary. I instructed patient to have his pre-op labs completed no later than Monday, 9/30. Patient requested a letter to be mailed to his home address that includes his surgery date of 10/4/24 and expected recovery time so that he can provide his employer this information. I verbalized that our office can do this for him, or if he can verify his employer's fax number and call us with this, that way we can fax it directly to them. Patient in agreement with either or. I advised that we can mail this per his request. I provided my direct contact information to contact should he have any further questions or concerns that arise for his upcoming surgery with Dr. Medley. Patient verbalized understanding of all information reviewed with him throughout our conversation and repeated information back to me to confirm information as well.

## 2024-09-26 ENCOUNTER — APPOINTMENT (OUTPATIENT)
Dept: LAB | Facility: HOSPITAL | Age: 44
End: 2024-09-26
Payer: COMMERCIAL

## 2024-09-26 ENCOUNTER — TELEPHONE (OUTPATIENT)
Dept: CARDIAC SURGERY | Facility: CLINIC | Age: 44
End: 2024-09-26

## 2024-09-26 DIAGNOSIS — K21.9 GASTROESOPHAGEAL REFLUX DISEASE WITHOUT ESOPHAGITIS: ICD-10-CM

## 2024-09-26 LAB
ANION GAP SERPL CALCULATED.3IONS-SCNC: 6 MMOL/L (ref 4–13)
BUN SERPL-MCNC: 22 MG/DL (ref 5–25)
CALCIUM SERPL-MCNC: 9.6 MG/DL (ref 8.4–10.2)
CHLORIDE SERPL-SCNC: 103 MMOL/L (ref 96–108)
CO2 SERPL-SCNC: 27 MMOL/L (ref 21–32)
CREAT SERPL-MCNC: 1.16 MG/DL (ref 0.6–1.3)
ERYTHROCYTE [DISTWIDTH] IN BLOOD BY AUTOMATED COUNT: 11.7 % (ref 11.6–15.1)
GFR SERPL CREATININE-BSD FRML MDRD: 76 ML/MIN/1.73SQ M
GLUCOSE SERPL-MCNC: 86 MG/DL (ref 65–140)
HCT VFR BLD AUTO: 44.9 % (ref 36.5–49.3)
HGB BLD-MCNC: 15.1 G/DL (ref 12–17)
MCH RBC QN AUTO: 28.9 PG (ref 26.8–34.3)
MCHC RBC AUTO-ENTMCNC: 33.6 G/DL (ref 31.4–37.4)
MCV RBC AUTO: 86 FL (ref 82–98)
PLATELET # BLD AUTO: 302 THOUSANDS/UL (ref 149–390)
PMV BLD AUTO: 9.5 FL (ref 8.9–12.7)
POTASSIUM SERPL-SCNC: 3.7 MMOL/L (ref 3.5–5.3)
RBC # BLD AUTO: 5.23 MILLION/UL (ref 3.88–5.62)
SODIUM SERPL-SCNC: 136 MMOL/L (ref 135–147)
WBC # BLD AUTO: 7.78 THOUSAND/UL (ref 4.31–10.16)

## 2024-09-26 PROCEDURE — 80048 BASIC METABOLIC PNL TOTAL CA: CPT

## 2024-09-26 PROCEDURE — 36415 COLL VENOUS BLD VENIPUNCTURE: CPT

## 2024-09-26 PROCEDURE — 85027 COMPLETE CBC AUTOMATED: CPT

## 2024-09-26 NOTE — TELEPHONE ENCOUNTER
Patient called and I spoke with him. He provided his employer's fax number, [phone], for his work letter to be faxed to. I verbalized understanding and confirmed fax number with patient. I advised that we will fax this to his employer. I reiterated that he will be receiving this letter via mail too and that it is uploaded to his [employer] account for his personal reference. I provided patient our office fax number, [phone] that he can provide his employer should they need any additional information to be submitted or completed in regards to his surgery. Patient in agreement with this and very appreciative of my help.        Received fax confirmation of letter to patients employer and uploaded under media.

## 2024-09-27 ENCOUNTER — ANESTHESIA EVENT (OUTPATIENT)
Dept: PERIOP | Facility: HOSPITAL | Age: 44
End: 2024-09-27
Payer: COMMERCIAL

## 2024-10-02 ENCOUNTER — TELEPHONE (OUTPATIENT)
Dept: CARDIAC SURGERY | Facility: CLINIC | Age: 44
End: 2024-10-02

## 2024-10-02 NOTE — TELEPHONE ENCOUNTER
I returned patients call and left a VM message for him. I advised where I was calling from and that I received a message that he had questions or concerns about his surgery scheduled on this Friday, 10/4/24 with Dr. Medley. I again provided my contact information for patient to return my call to discuss surgery information.

## 2024-10-02 NOTE — TELEPHONE ENCOUNTER
Patient calling from a remote location at work. Incredibly hard to make out what patient is saying as the phone was cutting in and out every couple words. Seems that patient is returning someone's call to review surgery instructions. Do not see anything in media with surgery instructions or noted in consult note on 9/25/24 of what information patient would need. Did tell patient that someone would be calling him the day before his surgery, tomorrow, to provide him with his arrival time for surgery. Unable to ascertain if patient is aware of showering or NPO instructions as this triager could not hear patient and patient could not hear this triager. See no pre-op testing has been completed. Unsure how to advise patient. Patient states he will be available by phone at 3pm today to discuss his surgery. Please advise.

## 2024-10-02 NOTE — PRE-PROCEDURE INSTRUCTIONS
No outpatient medications have been marked as taking for the 10/4/24 encounter (Hospital Encounter).      Pt does not take any prescription medications.    Medication instructions for day surgery reviewed. Please use only a sip of water to take your instructed medications. Avoid all over the counter vitamins, supplements and NSAIDS for one week prior to surgery per anesthesia guidelines. Tylenol is ok to take as needed.     You will receive a call one business day prior to surgery with an arrival time and hospital directions. If your surgery is scheduled on a Monday, the hospital will be calling you on the Friday prior to your surgery. If you have not heard from anyone by 8pm, please call the hospital supervisor through the hospital  at [phone]. ([place] [phone] or [place] [phone]).    Do not eat or drink anything after midnight the night before your surgery, including candy, mints, lifesavers, or chewing gum. Do not drink alcohol 24hrs before your surgery. Try not to smoke at least 24hrs before your surgery.       Follow the pre surgery showering instructions as listed in the “My Surgical Experience Booklet” or otherwise provided by your surgeon's office. Do not use a blade to shave the surgical area 1 week before surgery. It is okay to use a clean electric clippers up to 24 hours before surgery. Do not apply any lotions, creams, including makeup, cologne, deodorant, or perfumes after showering on the day of your surgery. Do not use dry shampoo, hair spray, hair gel, or any type of hair products.     No contact lenses, eye make-up, or artificial eyelashes. Remove nail polish, including gel polish, and any artificial, gel, or acrylic nails if possible. Remove all jewelry including rings and body piercing jewelry.     Wear causal clothing that is easy to take on and off. Consider your type of surgery.    Keep any valuables, jewelry, piercings at home. Please bring any specially ordered  equipment (sling, braces) if indicated.    Arrange for a responsible person to drive you to and from the hospital on the day of your surgery. Please confirm the visitor policy for the day of your procedure when you receive your phone call with an arrival time.     Call the surgeon's office with any new illnesses, exposures, or additional questions prior to surgery.    Please reference your “My Surgical Experience Booklet” for additional information to prepare for your upcoming surgery.

## 2024-10-03 NOTE — ANESTHESIA PREPROCEDURE EVALUATION
Procedure:  REPAIR HERNIA PARAESOPHAGEAL LAPAROSCOPIC W ROBOTICS (Abdomen)  ESOPHAGOGASTRODUODENOSCOPY (EGD) (Esophagus)    Relevant Problems   GI/HEPATIC   (+) GERD (gastroesophageal reflux disease)            Physical Exam    Airway    Mallampati score: II  TM Distance: >3 FB  Neck ROM: full     Dental   No notable dental hx     Cardiovascular  Rhythm: regular, Rate: normal, Cardiovascular exam normal    Pulmonary  Pulmonary exam normal Breath sounds clear to auscultation    Other Findings        Anesthesia Plan  ASA Score- 1     Anesthesia Type- general with ASA Monitors.         Additional Monitors:     Airway Plan: ETT.           Plan Factors-Exercise tolerance (METS): >4 METS.    Chart reviewed.  Imaging results reviewed. Existing labs reviewed. Patient summary reviewed.                  Induction- intravenous.    Postoperative Plan-     Perioperative Resuscitation Plan - Level 1 - Full Code.       Informed Consent- Anesthetic plan and risks discussed with patient.  I personally reviewed this patient with the CRNA. Discussed and agreed on the Anesthesia Plan with the CRNA..

## 2024-10-04 ENCOUNTER — ANESTHESIA (OUTPATIENT)
Dept: PERIOP | Facility: HOSPITAL | Age: 44
End: 2024-10-04
Payer: COMMERCIAL

## 2024-10-04 ENCOUNTER — TELEPHONE (OUTPATIENT)
Dept: CARDIAC SURGERY | Facility: CLINIC | Age: 44
End: 2024-10-04

## 2024-10-04 ENCOUNTER — HOSPITAL ENCOUNTER (OUTPATIENT)
Facility: HOSPITAL | Age: 44
Setting detail: OUTPATIENT SURGERY
Discharge: HOME | End: 2024-10-05
Attending: SURGERY | Admitting: SURGERY
Payer: COMMERCIAL

## 2024-10-04 DIAGNOSIS — K21.9 GASTROESOPHAGEAL REFLUX DISEASE WITHOUT ESOPHAGITIS: Primary | ICD-10-CM

## 2024-10-04 PROCEDURE — 43280 LAPAROSCOPY FUNDOPLASTY: CPT

## 2024-10-04 PROCEDURE — C1781 MESH (IMPLANTABLE): HCPCS | Performed by: SURGERY

## 2024-10-04 PROCEDURE — S2900 ROBOTIC SURGICAL SYSTEM: HCPCS | Performed by: SURGERY

## 2024-10-04 PROCEDURE — 43280 LAPAROSCOPY FUNDOPLASTY: CPT | Performed by: SURGERY

## 2024-10-04 DEVICE — BIO-A TISSUE REINFORCEMENT 7CMX10CM
Type: IMPLANTABLE DEVICE | Site: ABDOMEN | Status: FUNCTIONAL
Brand: GORE BIO-A TISSUE REINFORCEMENT

## 2024-10-04 RX ORDER — DOCUSATE SODIUM 100 MG/1
100 CAPSULE, LIQUID FILLED ORAL 2 TIMES DAILY
Status: DISCONTINUED | OUTPATIENT
Start: 2024-10-04 | End: 2024-10-05 | Stop reason: HOSPADM

## 2024-10-04 RX ORDER — EPHEDRINE SULFATE 50 MG/ML
INJECTION INTRAVENOUS AS NEEDED
Status: DISCONTINUED | OUTPATIENT
Start: 2024-10-04 | End: 2024-10-04

## 2024-10-04 RX ORDER — ONDANSETRON 2 MG/ML
4 INJECTION INTRAMUSCULAR; INTRAVENOUS EVERY 6 HOURS PRN
Status: DISCONTINUED | OUTPATIENT
Start: 2024-10-04 | End: 2024-10-05 | Stop reason: HOSPADM

## 2024-10-04 RX ORDER — MIDAZOLAM HYDROCHLORIDE 2 MG/2ML
INJECTION, SOLUTION INTRAMUSCULAR; INTRAVENOUS AS NEEDED
Status: DISCONTINUED | OUTPATIENT
Start: 2024-10-04 | End: 2024-10-04

## 2024-10-04 RX ORDER — HYDROMORPHONE HCL/PF 1 MG/ML
SYRINGE (ML) INJECTION AS NEEDED
Status: DISCONTINUED | OUTPATIENT
Start: 2024-10-04 | End: 2024-10-04

## 2024-10-04 RX ORDER — ENOXAPARIN SODIUM 100 MG/ML
40 INJECTION SUBCUTANEOUS DAILY
Status: DISCONTINUED | OUTPATIENT
Start: 2024-10-05 | End: 2024-10-05 | Stop reason: HOSPADM

## 2024-10-04 RX ORDER — HYDROMORPHONE HCL/PF 1 MG/ML
0.25 SYRINGE (ML) INJECTION
Status: DISCONTINUED | OUTPATIENT
Start: 2024-10-04 | End: 2024-10-04 | Stop reason: HOSPADM

## 2024-10-04 RX ORDER — CEFAZOLIN SODIUM 2 G/50ML
2000 SOLUTION INTRAVENOUS ONCE
Status: COMPLETED | OUTPATIENT
Start: 2024-10-04 | End: 2024-10-04

## 2024-10-04 RX ORDER — SODIUM CHLORIDE 9 MG/ML
INJECTION, SOLUTION INTRAVENOUS CONTINUOUS PRN
Status: DISCONTINUED | OUTPATIENT
Start: 2024-10-04 | End: 2024-10-04

## 2024-10-04 RX ORDER — ALBUTEROL SULFATE 0.83 MG/ML
2.5 SOLUTION RESPIRATORY (INHALATION) ONCE AS NEEDED
Status: DISCONTINUED | OUTPATIENT
Start: 2024-10-04 | End: 2024-10-04 | Stop reason: HOSPADM

## 2024-10-04 RX ORDER — ONDANSETRON 2 MG/ML
4 INJECTION INTRAMUSCULAR; INTRAVENOUS ONCE AS NEEDED
Status: DISCONTINUED | OUTPATIENT
Start: 2024-10-04 | End: 2024-10-04 | Stop reason: HOSPADM

## 2024-10-04 RX ORDER — SODIUM CHLORIDE, SODIUM LACTATE, POTASSIUM CHLORIDE, CALCIUM CHLORIDE 600; 310; 30; 20 MG/100ML; MG/100ML; MG/100ML; MG/100ML
INJECTION, SOLUTION INTRAVENOUS CONTINUOUS PRN
Status: DISCONTINUED | OUTPATIENT
Start: 2024-10-04 | End: 2024-10-04

## 2024-10-04 RX ORDER — FENTANYL CITRATE 50 UG/ML
INJECTION, SOLUTION INTRAMUSCULAR; INTRAVENOUS AS NEEDED
Status: DISCONTINUED | OUTPATIENT
Start: 2024-10-04 | End: 2024-10-04

## 2024-10-04 RX ORDER — SENNOSIDES 8.6 MG
650 CAPSULE ORAL EVERY 6 HOURS
Qty: 28 TABLET | Refills: 0 | Status: SHIPPED | OUTPATIENT
Start: 2024-10-04 | End: 2024-10-11

## 2024-10-04 RX ORDER — ACETAMINOPHEN 325 MG/1
975 TABLET ORAL ONCE
Status: COMPLETED | OUTPATIENT
Start: 2024-10-04 | End: 2024-10-04

## 2024-10-04 RX ORDER — ROCURONIUM BROMIDE 10 MG/ML
INJECTION, SOLUTION INTRAVENOUS AS NEEDED
Status: DISCONTINUED | OUTPATIENT
Start: 2024-10-04 | End: 2024-10-04

## 2024-10-04 RX ORDER — PANTOPRAZOLE SODIUM 40 MG/10ML
40 INJECTION, POWDER, LYOPHILIZED, FOR SOLUTION INTRAVENOUS DAILY
Status: DISCONTINUED | OUTPATIENT
Start: 2024-10-05 | End: 2024-10-05 | Stop reason: HOSPADM

## 2024-10-04 RX ORDER — POLYETHYLENE GLYCOL 3350 17 G/17G
17 POWDER, FOR SOLUTION ORAL DAILY PRN
Status: DISCONTINUED | OUTPATIENT
Start: 2024-10-04 | End: 2024-10-05 | Stop reason: HOSPADM

## 2024-10-04 RX ORDER — BUPIVACAINE HYDROCHLORIDE 2.5 MG/ML
INJECTION, SOLUTION EPIDURAL; INFILTRATION; INTRACAUDAL AS NEEDED
Status: DISCONTINUED | OUTPATIENT
Start: 2024-10-04 | End: 2024-10-04 | Stop reason: HOSPADM

## 2024-10-04 RX ORDER — DEXAMETHASONE SODIUM PHOSPHATE 10 MG/ML
INJECTION, SOLUTION INTRAMUSCULAR; INTRAVENOUS AS NEEDED
Status: DISCONTINUED | OUTPATIENT
Start: 2024-10-04 | End: 2024-10-04

## 2024-10-04 RX ORDER — DIPHENHYDRAMINE HYDROCHLORIDE 50 MG/ML
12.5 INJECTION INTRAMUSCULAR; INTRAVENOUS ONCE AS NEEDED
Status: DISCONTINUED | OUTPATIENT
Start: 2024-10-04 | End: 2024-10-04 | Stop reason: HOSPADM

## 2024-10-04 RX ORDER — SENNOSIDES 8.6 MG
1 TABLET ORAL DAILY
Status: DISCONTINUED | OUTPATIENT
Start: 2024-10-05 | End: 2024-10-05 | Stop reason: HOSPADM

## 2024-10-04 RX ORDER — PROPOFOL 10 MG/ML
INJECTION, EMULSION INTRAVENOUS AS NEEDED
Status: DISCONTINUED | OUTPATIENT
Start: 2024-10-04 | End: 2024-10-04

## 2024-10-04 RX ORDER — GABAPENTIN 300 MG/1
600 CAPSULE ORAL ONCE
Status: COMPLETED | OUTPATIENT
Start: 2024-10-04 | End: 2024-10-04

## 2024-10-04 RX ORDER — ONDANSETRON 2 MG/ML
INJECTION INTRAMUSCULAR; INTRAVENOUS AS NEEDED
Status: DISCONTINUED | OUTPATIENT
Start: 2024-10-04 | End: 2024-10-04

## 2024-10-04 RX ORDER — HYDROMORPHONE HCL/PF 1 MG/ML
0.5 SYRINGE (ML) INJECTION
Status: DISCONTINUED | OUTPATIENT
Start: 2024-10-04 | End: 2024-10-05 | Stop reason: HOSPADM

## 2024-10-04 RX ORDER — CELECOXIB 200 MG/1
200 CAPSULE ORAL ONCE
Status: COMPLETED | OUTPATIENT
Start: 2024-10-04 | End: 2024-10-04

## 2024-10-04 RX ORDER — OXYCODONE HCL 5 MG/5 ML
5 SOLUTION, ORAL ORAL EVERY 4 HOURS PRN
Status: DISCONTINUED | OUTPATIENT
Start: 2024-10-04 | End: 2024-10-05 | Stop reason: HOSPADM

## 2024-10-04 RX ORDER — LIDOCAINE HYDROCHLORIDE 20 MG/ML
INJECTION, SOLUTION EPIDURAL; INFILTRATION; INTRACAUDAL; PERINEURAL AS NEEDED
Status: DISCONTINUED | OUTPATIENT
Start: 2024-10-04 | End: 2024-10-04

## 2024-10-04 RX ORDER — ACETAMINOPHEN 160 MG/5ML
975 SUSPENSION ORAL EVERY 6 HOURS
Status: DISCONTINUED | OUTPATIENT
Start: 2024-10-04 | End: 2024-10-05 | Stop reason: HOSPADM

## 2024-10-04 RX ORDER — FENTANYL CITRATE/PF 50 MCG/ML
25 SYRINGE (ML) INJECTION
Status: DISCONTINUED | OUTPATIENT
Start: 2024-10-04 | End: 2024-10-04 | Stop reason: HOSPADM

## 2024-10-04 RX ORDER — SODIUM CHLORIDE, SODIUM LACTATE, POTASSIUM CHLORIDE, CALCIUM CHLORIDE 600; 310; 30; 20 MG/100ML; MG/100ML; MG/100ML; MG/100ML
75 INJECTION, SOLUTION INTRAVENOUS CONTINUOUS
Status: DISCONTINUED | OUTPATIENT
Start: 2024-10-04 | End: 2024-10-05

## 2024-10-04 RX ORDER — HEPARIN SODIUM 5000 [USP'U]/ML
5000 INJECTION, SOLUTION INTRAVENOUS; SUBCUTANEOUS
Status: COMPLETED | OUTPATIENT
Start: 2024-10-04 | End: 2024-10-04

## 2024-10-04 RX ORDER — OXYCODONE HYDROCHLORIDE 5 MG/1
5 TABLET ORAL EVERY 6 HOURS PRN
Qty: 15 TABLET | Refills: 0 | Status: SHIPPED | OUTPATIENT
Start: 2024-10-04 | End: 2024-10-14

## 2024-10-04 RX ADMIN — PROPOFOL 200 MG: 10 INJECTION, EMULSION INTRAVENOUS at 13:53

## 2024-10-04 RX ADMIN — SODIUM CHLORIDE, SODIUM LACTATE, POTASSIUM CHLORIDE, AND CALCIUM CHLORIDE: .6; .31; .03; .02 INJECTION, SOLUTION INTRAVENOUS at 16:38

## 2024-10-04 RX ADMIN — MIDAZOLAM 2 MG: 1 INJECTION INTRAMUSCULAR; INTRAVENOUS at 13:47

## 2024-10-04 RX ADMIN — FENTANYL CITRATE 25 MCG: 50 INJECTION INTRAMUSCULAR; INTRAVENOUS at 16:06

## 2024-10-04 RX ADMIN — SODIUM CHLORIDE, SODIUM LACTATE, POTASSIUM CHLORIDE, AND CALCIUM CHLORIDE: .6; .31; .03; .02 INJECTION, SOLUTION INTRAVENOUS at 13:56

## 2024-10-04 RX ADMIN — HYDROMORPHONE HYDROCHLORIDE 0.5 MG: 1 INJECTION, SOLUTION INTRAMUSCULAR; INTRAVENOUS; SUBCUTANEOUS at 16:31

## 2024-10-04 RX ADMIN — FENTANYL CITRATE 25 MCG: 50 INJECTION INTRAMUSCULAR; INTRAVENOUS at 15:10

## 2024-10-04 RX ADMIN — DOCUSATE SODIUM 100 MG: 100 CAPSULE, LIQUID FILLED ORAL at 18:18

## 2024-10-04 RX ADMIN — CEFAZOLIN SODIUM 2000 MG: 2 SOLUTION INTRAVENOUS at 14:07

## 2024-10-04 RX ADMIN — CELECOXIB 200 MG: 200 CAPSULE ORAL at 09:53

## 2024-10-04 RX ADMIN — ROCURONIUM BROMIDE 10 MG: 10 INJECTION, SOLUTION INTRAVENOUS at 15:11

## 2024-10-04 RX ADMIN — EPHEDRINE SULFATE 10 MG: 50 INJECTION, SOLUTION INTRAVENOUS at 14:28

## 2024-10-04 RX ADMIN — SUGAMMADEX 200 MG: 100 INJECTION, SOLUTION INTRAVENOUS at 16:43

## 2024-10-04 RX ADMIN — EPHEDRINE SULFATE 10 MG: 50 INJECTION, SOLUTION INTRAVENOUS at 14:30

## 2024-10-04 RX ADMIN — ROCURONIUM BROMIDE 10 MG: 10 INJECTION, SOLUTION INTRAVENOUS at 15:52

## 2024-10-04 RX ADMIN — FENTANYL CITRATE 25 MCG: 50 INJECTION INTRAMUSCULAR; INTRAVENOUS at 14:48

## 2024-10-04 RX ADMIN — LIDOCAINE HYDROCHLORIDE 100 MG: 20 INJECTION, SOLUTION EPIDURAL; INFILTRATION; INTRACAUDAL at 13:53

## 2024-10-04 RX ADMIN — ONDANSETRON 4 MG: 2 INJECTION INTRAMUSCULAR; INTRAVENOUS at 16:28

## 2024-10-04 RX ADMIN — DEXAMETHASONE SODIUM PHOSPHATE 10 MG: 10 INJECTION, SOLUTION INTRAMUSCULAR; INTRAVENOUS at 14:04

## 2024-10-04 RX ADMIN — PHENYLEPHRINE HYDROCHLORIDE 30 MCG/MIN: 10 INJECTION INTRAVENOUS at 15:27

## 2024-10-04 RX ADMIN — SODIUM CHLORIDE: 0.9 INJECTION, SOLUTION INTRAVENOUS at 13:48

## 2024-10-04 RX ADMIN — FENTANYL CITRATE 100 MCG: 50 INJECTION INTRAMUSCULAR; INTRAVENOUS at 13:53

## 2024-10-04 RX ADMIN — ROCURONIUM BROMIDE 10 MG: 10 INJECTION, SOLUTION INTRAVENOUS at 14:30

## 2024-10-04 RX ADMIN — HEPARIN SODIUM 5000 UNITS: 5000 INJECTION INTRAVENOUS; SUBCUTANEOUS at 09:54

## 2024-10-04 RX ADMIN — FENTANYL CITRATE 25 MCG: 50 INJECTION INTRAMUSCULAR; INTRAVENOUS at 17:30

## 2024-10-04 RX ADMIN — SODIUM CHLORIDE, SODIUM LACTATE, POTASSIUM CHLORIDE, AND CALCIUM CHLORIDE: .6; .31; .03; .02 INJECTION, SOLUTION INTRAVENOUS at 14:17

## 2024-10-04 RX ADMIN — FENTANYL CITRATE 25 MCG: 50 INJECTION INTRAMUSCULAR; INTRAVENOUS at 14:36

## 2024-10-04 RX ADMIN — ONDANSETRON 4 MG: 2 INJECTION INTRAMUSCULAR; INTRAVENOUS at 21:45

## 2024-10-04 RX ADMIN — GABAPENTIN 600 MG: 300 CAPSULE ORAL at 09:53

## 2024-10-04 RX ADMIN — OXYCODONE HYDROCHLORIDE 5 MG: 5 SOLUTION ORAL at 21:04

## 2024-10-04 RX ADMIN — ROCURONIUM BROMIDE 50 MG: 10 INJECTION, SOLUTION INTRAVENOUS at 13:53

## 2024-10-04 RX ADMIN — ACETAMINOPHEN 975 MG: 650 SUSPENSION ORAL at 18:16

## 2024-10-04 RX ADMIN — ACETAMINOPHEN 975 MG: 325 TABLET ORAL at 09:53

## 2024-10-04 NOTE — TELEPHONE ENCOUNTER
Patient called and I spoke with him. He is on the way to the hospital right now for his surgery today with Dr. Medley. He called to let us know that his employer received his work letter and that yesterday they faxed FMLA papers to us to be completed. I advised that I will let our team know and informed that JACE Sequeira completes these forms for our patients. Patient stated he does have the forms with him and is bringing them to the hospital today but is unsure if he should give them to the hospital staff before surgery. I advised patient that he can bring these upon arrival today, and to let the staff know that the papers are for Dr. Medley's team, specifically either Fabby Daniel or Deedee. I reiterated with patient that we will check for his FMLA papers faxed by his employer. I provided support to patient and he was very appreciative and understanding of our conversation.

## 2024-10-04 NOTE — OP NOTE
OPERATIVE REPORT  PATIENT NAME: Jose Reyes    :  1980  MRN: [id number]  Pt Location:  OR ROOM 15    SURGERY DATE: 10/4/2024    Surgeons and Role:     * Paulo Medley,  - Primary     * Chelsi Kaufman PA-C - Assisting     * Shirin Murray PA-C - Assisting     * Kev Cruz MD - Observing    Preop Diagnosis:  Gastroesophageal reflux disease without esophagitis [K21.9]    Post-Op Diagnosis Codes:     * Gastroesophageal reflux disease without esophagitis [K21.9]    Procedure(s):  EGD  Robotic assisted hiatal hernia repair with partial fundoplication and placement of Bio-A mesh      Specimen(s):  * No specimens in log *    Estimated Blood Loss:   Minimal    Drains:  * No LDAs found *    Anesthesia Type:   General    Operative Indications:  Gastroesophageal reflux disease without esophagitis [K21.9]      Operative Findings:  Small type I hiatal hernia      Complications:   None    Procedure and Technique:  After obtaining informed consent from the patient, they were transferred to the operating room and placed supine on the operating table. General anesthesia was then induced and a single-lumen endotracheal tube was placed without incident.A footboard was placed at the base of the bed and the patients feet, legs and thighs were secured to the bed. All bony prominences were padded and checked.      Next a formal time-out was called verifying patient , date of birth, procedure, consent, antibiotics, beta-blocker as indicated, anticipated specimens with handling, SCD use and other special considerations.     The abdomen was then prepped with ChloraPrep and draped in standard surgical fashion. An stab incision was made at Noriega's point, 2 finger breaths below the left costal margin in the midclavicular line. The Veress needle was inserted through the abdominal wall and into the abdominal cavity using the water drop technique. Next the abdomen was then insufflated without issue.  A supraumbilical 8  mm incision was made and the abdomen was entered using the Optiview technique with a 0 degree scope. There was no sign Veress needle or trocar injury. All other ports were placed under direct visualization using the laparoscopic camera.  3mL of 0.25% Marcaine was used to anesthetize the peritoneum for all additional port placements. Three 8mm robotic ports were placed in a row - one to the patient's right and two to the left. A RLQ 8mm assistant port was placed between the camera and the RUQ robotic port.  Finally a 5 mm incision was made just to the left of the xiphoid process.  A small Sahra liver retractor was placed through here and secured to the bedside with a sterile molina. The supraumbilical port was exchanged out for a robotic 8mm port. The patient was then placed in full reverse [place] at 30 degrees.     The [employer] Xi robot was docked at this time with the 30 degree robotic scope. A tip-up fenestrated grasper, caudier grasper and robotic vessel sealer were inserted and tested. I un-scrubbed at this time and went to the robotic console.     We started our dissection at the pars using the vessel sealer and continued this dissection on to the right navi. The muscle and overlying peritoneum of the right navi was protected. The dissection was continued anteriorly and to the left. Here because of the inflammation it was harder to keep the peritoneum of the left navi intact. I continued dissection into the mediastinum,  all mediastinal attachments and taking care to avoid the pleura. We specifically looked for and identified the bilateral vagus nerves in the chest, taking care to stay well away from these with our dissection. I encircled the esophagus in the chest with a penrose drain and downward retraction was applied by the asisstant. The esophagus was taken off of the aorta. Dissection was carried well into the chest.     I  then transitioned to the greater curvature of the stomach on  the fundus.  The superior 1/3rd of short gastric vessels were taken with the vessel sealer moving cephalad around the fundus towards the GE junction. We continued this dissection up towards the left corner with rolling the stomach medially.  This eventually allowed us to meet up with our previous mediastinal dissection from the left.     I examined the GE junction and measured it at 4cm below the diaphragm.      Next I performed our crural repair with over a 52Fr Bougie. Single interrupted 0 Ethibond sutures were used to reapproximate the crura posteriorly.  A total of 3 sutures were placed posteriorly.  When finished the esophagus without any bougie or EGD had a few mm of room circumferentially around the crura.  Next a Bio A Saffell semi-biologic mesh was placed at the hiatus given the exposed muscle of the left navi. This was positioned in place behind the liver and underneath the gastroesophageal junction.    We then performed a partial anterior Allen fundoplication using interrupted 2-0 Ethibond incorporating fundus, esophagus and the crura. 6 sutures were used. I then performed an EGD. The scope was advanced and there was no esophagitis, there were no other esophageal abnormalities. The scope was advanced into the stomach without difficulty. The scope was then removed.      The abdomen was then inspected and thoroughly aspirated dry.  There was no active bleeding identified. The robot was undocked at this time and all robotic instruments were removed.. The skin and soft tissue was closed with 4 Monocryl.  Surgical glue was applied to all incisions.     Sponge, needle and instrument counts were correct at the conclusion of the procedure. The patient was extubated without incident in the operating room and was transported to the PACU in stable condition.NANCY Murray was scrubbed as a bedside assistant for the entirety of the procedure as no other qualified assistant or general surgery resident was available. She was  critical to the performance of this operation as she was the bedside robotic assistant. In doing so, she aided with retraction, needle insertion/removal, irrigation, suctioning and instrument exchange.       I was present for the entire procedure.    Patient Disposition:  PACU              SIGNATURE: Paulo Medley DO  DATE: October 4, 2024  TIME: 4:31 PM

## 2024-10-04 NOTE — ANESTHESIA POSTPROCEDURE EVALUATION
Post-Op Assessment Note    CV Status:  Stable  Pain Score: 0    Pain management: adequate       Mental Status:  Sleepy and arousable   Hydration Status:  Euvolemic   PONV Controlled:  Controlled   Airway Patency:  Patent  Airway: intubated     Post Op Vitals Reviewed: Yes    No anethesia notable event occurred.    Staff: CRNA           /58 (10/04/24 1657)    Temp (!) 97.1 °F (36.2 °C) (10/04/24 1657)    Pulse 92 (10/04/24 1700)   Resp 20 (10/04/24 1700)    SpO2 96 % (10/04/24 1700)

## 2024-10-04 NOTE — INTERVAL H&P NOTE
H&P reviewed. After examining the patient I find no changes in the patients condition since the H&P had been written.    OR for robotic assisted hiatal hernia repair with partial fundoplication.

## 2024-10-04 NOTE — DISCHARGE INSTR - AVS FIRST PAGE
"Hiatal Hernia Repair    During this hospitalization you underwent a minimally invasive hiatal hernia repair with a partial fundoplication. Your discharge instructions are below.     Incision Care:  - Your incisions were closed with stitches underneath of the skin which will dissolve. There is purple surgical glue on top of the incisions. The surgical glue will flake off over the next few weeks.   - You do not need dressings over your other incisions.  Do not apply any cream or ointments to the incisions unless instructed by your surgeon.  - You may shower daily - no soaking in a tub bath. Wash your incisions gently with soap and water and pat dry. Do not scrub the incisions.  - Bruising at your incisions is normal. This will resolve over the next few weeks.     Activity  - No lifting greater than 10lbs until you are evaluated in the office.   - Walking and light activity is encouraged. Recommend that you are active during the day.  - No driving while you are using narcotic pain medications. If you are no longer taking narcotics and considering driving, you must make sure you can quickly react to changes on the road. This can be challenging in the first few weeks after surgery.     Pain:  - Some degree of pain or discomfort after surgery is expected.    - Your pain regiment includes the following:   - Tylenol 650 mg tablet. Take 1 tablet, every 6 hours for 1 week.    - Oxycodone (Narcotic) 5mg tablet. Take 1 tablet, every 4-6 hours as needed for pain.     Diet:  - Refer to the \"Diet after Paraesophageal Hernia Repair\" diet instructions you were given preoperatively.   - It is normal to have difficulty with fluids and food in the postoperative period. This is due to swelling and will improve with time.     Medications:  - Continue on your home medications including any blood thinner and aspirin, as instructed.     Bowel Regiment:  - Constipation after surgery while on narcotic pain medications is normal.  - You should " continue taking a bowel regiment while on a narcotic pain medication to keep your bowel movements soft. Your discharge bowel regiment:   - Docusate (Colace) 100mg tablet. Take 1 tablet, twice a day.    - Senna 8.6mg tablet. Take 1 tablet daily.   - If your bowel movements become lose, stop taking the bowel regiment above.     Follow-up:  - You have a scheduled follow-up appointment on: 10/23/2024 at 9:00am  - A couple of days after discharge our office will call you to check in with how you are recovering at home.     Concerns:  - Call the office for any questions or concerns. Many postoperative issues can be sorted out over the phone.   - Call the office or go to the Emergency Department if you develop a fever greater than 101, persistent chills, persistent nausea/vomiting, inability to take in sufficient food or fluids, worsening or uncontrolled pain, and/or increasing redness or foul smelling drainage from an incision.     Thoracic Surgery Office: [phone]    Dr. William Burfeind, MD Rachael Hart, PA-C Dr. Meredith Harrison, MD Amylyn Mortimer, PA-C Dr. Dustin Manchester, MD Dr. Stephen Dingley, DO

## 2024-10-05 ENCOUNTER — APPOINTMENT (OUTPATIENT)
Dept: RADIOLOGY | Facility: HOSPITAL | Age: 44
End: 2024-10-05
Payer: COMMERCIAL

## 2024-10-05 VITALS
RESPIRATION RATE: 16 BRPM | SYSTOLIC BLOOD PRESSURE: 132 MMHG | HEART RATE: 75 BPM | DIASTOLIC BLOOD PRESSURE: 79 MMHG | OXYGEN SATURATION: 98 % | TEMPERATURE: 98.2 F | WEIGHT: 167.77 LBS | BODY MASS INDEX: 27.95 KG/M2 | HEIGHT: 65 IN

## 2024-10-05 LAB
ANION GAP SERPL CALCULATED.3IONS-SCNC: 13 MMOL/L (ref 4–13)
BUN SERPL-MCNC: 14 MG/DL (ref 5–25)
CALCIUM SERPL-MCNC: 8.7 MG/DL (ref 8.4–10.2)
CHLORIDE SERPL-SCNC: 100 MMOL/L (ref 96–108)
CO2 SERPL-SCNC: 22 MMOL/L (ref 21–32)
CREAT SERPL-MCNC: 1.07 MG/DL (ref 0.6–1.3)
ERYTHROCYTE [DISTWIDTH] IN BLOOD BY AUTOMATED COUNT: 11.9 % (ref 11.6–15.1)
GFR SERPL CREATININE-BSD FRML MDRD: 83 ML/MIN/1.73SQ M
GLUCOSE SERPL-MCNC: 139 MG/DL (ref 65–140)
HCT VFR BLD AUTO: 44.6 % (ref 36.5–49.3)
HGB BLD-MCNC: 15.3 G/DL (ref 12–17)
MAGNESIUM SERPL-MCNC: 1.8 MG/DL (ref 1.9–2.7)
MCH RBC QN AUTO: 29.3 PG (ref 26.8–34.3)
MCHC RBC AUTO-ENTMCNC: 34.3 G/DL (ref 31.4–37.4)
MCV RBC AUTO: 85 FL (ref 82–98)
PLATELET # BLD AUTO: 255 THOUSANDS/UL (ref 149–390)
PMV BLD AUTO: 9 FL (ref 8.9–12.7)
POTASSIUM SERPL-SCNC: 4 MMOL/L (ref 3.5–5.3)
RBC # BLD AUTO: 5.23 MILLION/UL (ref 3.88–5.62)
SODIUM SERPL-SCNC: 135 MMOL/L (ref 135–147)
WBC # BLD AUTO: 11.15 THOUSAND/UL (ref 4.31–10.16)

## 2024-10-05 PROCEDURE — 80048 BASIC METABOLIC PNL TOTAL CA: CPT | Performed by: SURGERY

## 2024-10-05 PROCEDURE — 85027 COMPLETE CBC AUTOMATED: CPT | Performed by: SURGERY

## 2024-10-05 PROCEDURE — 99024 POSTOP FOLLOW-UP VISIT: CPT | Performed by: THORACIC SURGERY (CARDIOTHORACIC VASCULAR SURGERY)

## 2024-10-05 PROCEDURE — 74220 X-RAY XM ESOPHAGUS 1CNTRST: CPT

## 2024-10-05 PROCEDURE — NC001 PR NO CHARGE: Performed by: THORACIC SURGERY (CARDIOTHORACIC VASCULAR SURGERY)

## 2024-10-05 PROCEDURE — 83735 ASSAY OF MAGNESIUM: CPT | Performed by: SURGERY

## 2024-10-05 RX ORDER — MAGNESIUM SULFATE HEPTAHYDRATE 40 MG/ML
2 INJECTION, SOLUTION INTRAVENOUS ONCE
Status: COMPLETED | OUTPATIENT
Start: 2024-10-05 | End: 2024-10-05

## 2024-10-05 RX ADMIN — SODIUM CHLORIDE, SODIUM LACTATE, POTASSIUM CHLORIDE, AND CALCIUM CHLORIDE 75 ML/HR: .6; .31; .03; .02 INJECTION, SOLUTION INTRAVENOUS at 02:44

## 2024-10-05 RX ADMIN — POLYETHYLENE GLYCOL 3350 17 G: 17 POWDER, FOR SOLUTION ORAL at 08:29

## 2024-10-05 RX ADMIN — ENOXAPARIN SODIUM 40 MG: 40 INJECTION SUBCUTANEOUS at 08:29

## 2024-10-05 RX ADMIN — SENNOSIDES 8.6 MG: 8.6 TABLET, FILM COATED ORAL at 08:31

## 2024-10-05 RX ADMIN — DOCUSATE SODIUM 100 MG: 100 CAPSULE, LIQUID FILLED ORAL at 08:31

## 2024-10-05 RX ADMIN — IOHEXOL 75 ML: 350 INJECTION, SOLUTION INTRAVENOUS at 12:18

## 2024-10-05 RX ADMIN — ACETAMINOPHEN 975 MG: 650 SUSPENSION ORAL at 05:05

## 2024-10-05 RX ADMIN — MAGNESIUM SULFATE HEPTAHYDRATE 2 G: 40 INJECTION, SOLUTION INTRAVENOUS at 08:34

## 2024-10-05 RX ADMIN — PANTOPRAZOLE SODIUM 40 MG: 40 INJECTION, POWDER, FOR SOLUTION INTRAVENOUS at 08:29

## 2024-10-05 NOTE — PROGRESS NOTES
"  Progress Note - Surgery-Thoracic   Name: Jose Reyes 44 y.o. male I MRN: [id number]  Unit/Bed#: [facility] 511-01 I Date of Admission: 10/4/2024   Date of Service: 10/4/2024 I Hospital Day: 0     Assessment & Plan  GERD (gastroesophageal reflux disease)  Postop day 1 status post robotic hiatal hernia repair with mesh and partial fundoplication    Clear liquid diet as tolerated  Discontinue IV fluids  Plan for esophagram today, pending review, advance to full liquid diet with discharge home on full liquid diet  Pain control as needed  DVT prophylaxis: Lovenox      Subjective/Objective   Subjective:   Patient seen and examined at bedside, in no acute distress. No acute events overnight. Patient's pain is well controlled. He denies nausea, vomiting, chest pain or shortness of breath.  Tolerated clears.    Objective:   Vitals:Blood pressure 125/65, pulse 84, temperature 97.7 °F (36.5 °C), temperature source Oral, resp. rate 18, height 5' 5\" (1.651 m), weight 76.7 kg (169 lb), SpO2 95%.,Body mass index is 28.12 kg/m².   Temp (24hrs), Av.5 °F (36.4 °C), Min:97.1 °F (36.2 °C), Max:98 °F (36.7 °C)        Intake/Output Summary (Last 24 hours) at 10/4/2024 2358  Last data filed at 10/4/2024 2123  Gross per 24 hour   Intake 3408.75 ml   Output 700 ml   Net 2708.75 ml       Invasive Devices       Peripheral Intravenous Line  Duration             Peripheral IV 10/04/24 Dorsal (posterior);Left Hand <1 day    Peripheral IV 10/04/24 Right Forearm <1 day                    Physical Exam:  General: No acute distress, alert and oriented  CV: Well perfused, regular rate and rhythm  Lungs: Normal work of breathing, no increased respiratory effort on room air  Abdomen: Soft, appropriately tender, non-distended. Incisions clean, dry and intact.  Extremities: No edema, clubbing or cyanosis  Skin: Warm, dry    Lab Results: BMP/CMP:   Lab Results   Component Value Date    SODIUM 135 10/05/2024    K 4.0 10/05/2024     10/05/2024    CO2 22 " 10/05/2024    BUN 14 10/05/2024    CREATININE 1.07 10/05/2024    CALCIUM 8.7 10/05/2024    EGFR 83 10/05/2024    and CBC:   Lab Results   Component Value Date    WBC 11.15 (H) 10/05/2024    HGB 15.3 10/05/2024    HCT 44.6 10/05/2024    MCV 85 10/05/2024     10/05/2024    RBC 5.23 10/05/2024    MCH 29.3 10/05/2024    MCHC 34.3 10/05/2024    RDW 11.9 10/05/2024    MPV 9.0 10/05/2024     VTE Prophylaxis: Sequential compression device (Venodyne)  and Enoxaparin (Lovenox)    Vicenta Edwards MD  General Surgery

## 2024-10-05 NOTE — ASSESSMENT & PLAN NOTE
Postop day 1 status post robotic hiatal hernia repair with mesh and partial fundoplication    Clear liquid diet as tolerated  Discontinue IV fluids  Plan for esophagram today, pending review, advance to full liquid diet with discharge home on full liquid diet  Pain control as needed  DVT prophylaxis: Lovenox

## 2024-10-05 NOTE — QUICK NOTE
Patient seen and examined at bedside postoperatively after robotic hiatal hernia repair with mesh and partial fundoplication.  Patient doing well postoperatively.  Tolerating clear liquid diet.  Pain controlled.    Abdomen soft, appropriately tender, nondistended.    Plan:  Diet as tolerated  LR at 75  Esophagram to morning prior to advancement to full liquid diet

## 2024-10-05 NOTE — DISCHARGE SUMMARY
Discharge Summary - Surgery-General   Name: Jose Reyes 44 y.o. male I MRN: [id number]  Unit/Bed#: [facility] 511-01 I Date of Admission: 10/4/2024   Date of Service: 10/5/2024 I Hospital Day: 0    Admission Date: 10/4/2024 0915  Discharge Date: 10/05/24  Admitting Diagnosis: Gastroesophageal reflux disease without esophagitis [K21.9]  Discharge Diagnosis:   Medical Problems       Resolved Problems  Date Reviewed: 8/15/2024   None         HPI: Jose Reyes is a 44 y.o. male referred for evaluation of GERD. His medical history only includes a knee surgery and sinus surgery.      He states that he has had reflux pain for at least a few years. The medications used to help but no longer help. He continues to complain of severe substernal pain. He sleeps with pillows but still has reflux pain. Limits eating before bed. He has no dysphagia but sometimes has nausea. He has an occasional cough.     Procedures Performed: No orders of the defined types were placed in this encounter.      Summary of Hospital Course: Patient was admitted on 10/4 and underwent robotic assisted hiatal hernia repair with partial fundoplication and placement of Bio-A mesh along with EGD on 10/4.  There were no intraoperative issues.  Immediately postoperatively the patient was started on clear liquids and was continued on IV fluids.  On postop day 1 the patient underwent an esophagram which was ultimately normal and the patient was advanced to full liquid diet.  By postop day 1 the patient was tolerating full liquid diet, was out of bed and ambulating, was voiding without difficulty, was having some bowel function.  Pain was well-controlled.  The patient was discharged on postop day 1 in hemodynamically stable condition.  Patient was instructed to attend his follow-up appointment with the thoracic surgery team as scheduled.  Patient was provided pain prescription on discharge.  All additional questions and concerns were appropriately  addressed.    Significant Findings, Care, Treatment and Services Provided: See above    Complications: None    Condition at Discharge: good       Discharge instructions/Information to patient and family:   See After Visit Summary (AVS) for information provided to patient and family.      Provisions for Follow-Up Care:  See after visit summary for information related to follow-up care and any pertinent home health orders.      PCP: Itz De La Rosa DO    Disposition: See After Visit Summary for discharge disposition information.    Planned Readmission: No     Discharge Medications:  See after visit summary for reconciled discharge medications provided to patient and family.      Discharge Statement:  I have spent a total time of 30 minutes in caring for this patient on the day of the visit/encounter. >30 minutes of time was spent on: Instructions for management, Patient and family education, and Documenting in the medical record.

## 2024-10-23 ENCOUNTER — OFFICE VISIT (OUTPATIENT)
Dept: CARDIAC SURGERY | Facility: HOSPITAL | Age: 44
End: 2024-10-23

## 2024-10-23 VITALS
HEIGHT: 65 IN | TEMPERATURE: 97.9 F | OXYGEN SATURATION: 99 % | HEART RATE: 89 BPM | DIASTOLIC BLOOD PRESSURE: 64 MMHG | WEIGHT: 159.8 LBS | BODY MASS INDEX: 26.62 KG/M2 | RESPIRATION RATE: 18 BRPM | SYSTOLIC BLOOD PRESSURE: 112 MMHG

## 2024-10-23 DIAGNOSIS — K21.9 GASTROESOPHAGEAL REFLUX DISEASE WITHOUT ESOPHAGITIS: Primary | ICD-10-CM

## 2024-10-23 PROCEDURE — 99024 POSTOP FOLLOW-UP VISIT: CPT | Performed by: SURGERY

## 2024-10-23 NOTE — ASSESSMENT & PLAN NOTE
44yM w/ GERD s/p r-HHR w/ Allen on 10/4/24.    Continue diet as tolerated. Continue not taking aciphex.   Follow up with me in ~3weeks.  May return to work.     Follow up with GI on 10/31.

## 2024-10-23 NOTE — LETTER
October 23, 2024     Patient: Jose Reyes  YOB: 1980  Date of Visit: 10/23/2024      To Whom it May Concern:    Jose Reyes is under my professional care. Tanner was seen in my office on 10/23/2024. Tanner may return to work on 10/24/24 . He should still take it slow going back to work with lifting heavy objects given his abdominal surgery this month.     If you have any questions or concerns, please don't hesitate to call.         Sincerely,          Paulo Medley,         CC: No Recipients

## 2024-10-31 ENCOUNTER — OFFICE VISIT (OUTPATIENT)
Dept: GASTROENTEROLOGY | Facility: CLINIC | Age: 44
End: 2024-10-31
Payer: COMMERCIAL

## 2024-10-31 VITALS
DIASTOLIC BLOOD PRESSURE: 72 MMHG | SYSTOLIC BLOOD PRESSURE: 118 MMHG | WEIGHT: 159 LBS | BODY MASS INDEX: 26.49 KG/M2 | HEIGHT: 65 IN

## 2024-10-31 DIAGNOSIS — Z98.890 HISTORY OF FUNDOPLICATION: Primary | ICD-10-CM

## 2024-10-31 PROCEDURE — 99213 OFFICE O/P EST LOW 20 MIN: CPT | Performed by: INTERNAL MEDICINE

## 2024-10-31 NOTE — PROGRESS NOTES
[facility] - Outpatient Follow-up Note  Jose Reyes 44 y.o. male MRN: [id number]  Encounter: [id number]    ASSESSMENT AND PLAN:      1. History of fundoplication  Will follow up as needed      Follow up appointment: As needed    _______________________      Chief Complaint   Patient presents with    Follow-up     Patient feels a lot better post surgery       HPI:   Patient is a 44 y.o. male with a significant PMH of GERD presenting for follow up regarding reflux.  Since having his robot-assisted laparoscopic hiatal hernia repair with Allen fundoplication he is asymptomatic.  He is quite pleased with results and is sleeping through the night without any pain.  He has not required any antisecretory therapy.  He is still having some issues with food going down slowly, but this to be expected.  He is scheduled follow-up with his surgeon.  He is quite thankful.    Historical Information   Past Medical History:   Diagnosis Date    GERD (gastroesophageal reflux disease)      Past Surgical History:   Procedure Laterality Date    HIATAL HERNIA REPAIR N/A 10/4/2024    Procedure: REPAIR HERNIA HIATAL LAPAROSCOPIC W ROBOTICS, PARTIAL FUNDOPLICATION WITH MESH;  Surgeon: Paulo Medley DO;  Location: [facility];  Service: Thoracic    KNEE SURGERY      WA ESOPHAGOGASTRODUODENOSCOPY TRANSORAL DIAGNOSTIC N/A 10/4/2024    Procedure: ESOPHAGOGASTRODUODENOSCOPY (EGD);  Surgeon: Paulo Medley DO;  Location: [facility];  Service: Thoracic    SINUS SURGERY      UPPER GASTROINTESTINAL ENDOSCOPY  07/2024     Social History     Substance and Sexual Activity   Alcohol Use Not Currently     Social History     Substance and Sexual Activity   Drug Use Never     Social History     Tobacco Use   Smoking Status Never    Passive exposure: Never   Smokeless Tobacco Never     Family History   Problem Relation Age of Onset    Prostate cancer Father     Cancer Father         Prostate    Diabetes  "Maternal Grandmother     Colon cancer Neg Hx     Colon polyps Neg Hx        No current outpatient medications on file.  No Known Allergies  Reviewed medications and allergies and updated as indicated    PHYSICAL EXAM:    Blood pressure 118/72, height 5' 5\" (1.651 m), weight 72.1 kg (159 lb). Body mass index is 26.46 kg/m².  General Appearance: NAD, cooperative, alert  Eyes: Anicteric  GI:  Soft, non-tender, non-distended; normal bowel sounds; no masses, no organomegaly   Rectal: Deferred  Musculoskeletal: No edema.  Skin:  No jaundice    Lab Results:   Lab Results   Component Value Date    WBC 11.15 (H) 10/05/2024    WBC 7.78 09/26/2024    HGB 15.3 10/05/2024    HGB 15.1 09/26/2024    MCV 85 10/05/2024     10/05/2024     09/26/2024     Lab Results   Component Value Date    K 4.0 10/05/2024     10/05/2024    CO2 22 10/05/2024    BUN 14 10/05/2024    CREATININE 1.07 10/05/2024    CALCIUM 8.7 10/05/2024    EGFR 83 10/05/2024     No results found for: \"IRON\", \"TIBC\", \"FERRITIN\"  No results found for: \"LIPASE\"    Radiology Results:   FL esophagram complete    Result Date: 10/5/2024  Narrative: LIMITED ESOPHAGRAM INDICATION: POD1 HHR. COMPARISON: None TECHNIQUE: A limited single contrast upper GI study was performed with approximately 75 mL of  Omnipaque 350 oral contrast. IMAGES: 20 FLUOROSCOPY TIME: 8 seconds FINDINGS: The patient is status post hiatal hernia repair. No evidence of a leak or fixed stricture. Contrast passes freely from the esophagus into the stomach.     Impression: No evidence of a leak. Resident: BARRY SUNSHINE I, the attending radiologist, have reviewed the images and agree with the final report above. Workstation performed: [id number]   "

## 2024-11-26 NOTE — ASSESSMENT & PLAN NOTE
44yM s/p robotic hiatal hernia repair with Allen fundoplication on 10/4/24    He is doing excellent after surgery. His debilitating severe GERD pain is gone. He remains off of Aciphex.     No restrictions. Diet as tolerating.   He may follow-up 6months after surgery (~April 2025).

## 2024-11-26 NOTE — PROGRESS NOTES
"Name: Jose Reyes      : 1980      MRN: [id number]  Encounter Provider: Paulo Medley DO  Encounter Date: 2024   Encounter department: [facility]     :  Assessment & Plan  Gastroesophageal reflux disease without esophagitis  44yM s/p robotic hiatal hernia repair with Allen fundoplication on 10/4/24    He is doing excellent after surgery. His debilitating severe GERD pain is gone. He remains off of Aciphex.     No restrictions. Diet as tolerating.   He may follow-up 6months after surgery (~2025).              Thoracic History   No problems updated.   History of Present Illness   HPI  Jose Reyes is a 44 y.o. male who presents for follow-up after his robotic hiatal hernia repair with Allen fundoplication on 10/4/24. He was last seen in the office on 10/23/24.      From my consult note on 24: \"Jose Reyes is a 44 y.o. male referred for evaluation of GERD. His medical history only includes a knee surgery and sinus surgery. He states that he has had reflux pain for at least a few years. The medications used to help but no longer help. He continues to complain of severe substernal pain. He sleeps with pillows but still has reflux pain. Limits eating before bed. He has no dysphagia but sometimes has nausea. He has an occasional cough. \"     From 10/23/24 Office: Surgery went well and he is recovering very well. No incisional pain after surgery. Soft food is going down fine when he takes his time eating. When he eats faster things feel like they get stuck in the cervical esophagus. No nausea or vomiting. Has tried a lot of different foods without restriction and is back on his own regular diet. He is now sleeping through the night without heartburn. No heartburn with eating or during the day. He is not taking his Aciphex.     Continues to improve. Minimal rare surgical pain. Rarely has any feeling of dysphagia. No heartburn pain remains off of Aciphex.     Review " of Systems   Constitutional:  Negative for chills and fatigue.   HENT:  Negative for sore throat, trouble swallowing and voice change.    Eyes:  Negative for photophobia and visual disturbance.   Respiratory:  Negative for chest tightness and shortness of breath.    Cardiovascular:  Negative for chest pain and palpitations.   Gastrointestinal:  Negative for nausea and vomiting.   Skin:  Negative for rash and wound.   Neurological:  Negative for dizziness, weakness, light-headedness and headaches.   All other systems reviewed and are negative.          Objective   There were no vitals taken for this visit.    There were no vitals taken for this visit.  ECOG    Physical Exam  Constitutional:       Appearance: Normal appearance.   HENT:      Head: Normocephalic and atraumatic.   Cardiovascular:      Rate and Rhythm: Normal rate and regular rhythm.      Heart sounds: Normal heart sounds. No murmur heard.  Abdominal:      General: Abdomen is flat. There is no distension.      Palpations: Abdomen is soft.   Musculoskeletal:         General: Normal range of motion.      Cervical back: Normal range of motion.      Right lower leg: No edema.      Left lower leg: No edema.   Lymphadenopathy:      Cervical: No cervical adenopathy.   Skin:     General: Skin is warm.      Comments: Well healed abdominal incisions.   Neurological:      General: No focal deficit present.      Mental Status: He is alert and oriented to person, place, and time. Mental status is at baseline.   Psychiatric:         Mood and Affect: Mood normal.         Behavior: Behavior normal.         Thought Content: Thought content normal.         Judgment: Judgment normal.         Labs:         Pathology: I have reviewed pathology reports described above.

## 2024-11-27 ENCOUNTER — OFFICE VISIT (OUTPATIENT)
Dept: CARDIAC SURGERY | Facility: HOSPITAL | Age: 44
End: 2024-11-27

## 2024-11-27 VITALS
OXYGEN SATURATION: 97 % | RESPIRATION RATE: 16 BRPM | DIASTOLIC BLOOD PRESSURE: 84 MMHG | TEMPERATURE: 97.7 F | SYSTOLIC BLOOD PRESSURE: 120 MMHG | HEART RATE: 74 BPM | BODY MASS INDEX: 26.33 KG/M2 | WEIGHT: 158 LBS | HEIGHT: 65 IN

## 2024-11-27 DIAGNOSIS — K21.9 GASTROESOPHAGEAL REFLUX DISEASE WITHOUT ESOPHAGITIS: Primary | ICD-10-CM

## 2024-11-27 PROCEDURE — 99024 POSTOP FOLLOW-UP VISIT: CPT | Performed by: SURGERY

## 2025-04-22 NOTE — ASSESSMENT & PLAN NOTE
"44yM s/p robotic hiatal hernia repair with Allen fundoplication on 10/4/24     He has done very well after surgery.   Rare \"scratchiness/tickle\" in his throat with some meals but no real GERD.   Not on PPI.    No further follow-up needed. He will call in the future if he has any questions or develops heartburn again.      "

## 2025-04-22 NOTE — PROGRESS NOTES
"Name: Jose Reyes      : 1980      MRN: [id number]  Encounter Provider: Paulo Medley DO  Encounter Date: 2025   Encounter department: [facility]  :  Assessment & Plan  Gastroesophageal reflux disease without esophagitis  44yM s/p robotic hiatal hernia repair with Allen fundoplication on 10/4/24     He has done very well after surgery.   Rare \"scratchiness/tickle\" in his throat with some meals but no real GERD.   Not on PPI.    No further follow-up needed. He will call in the future if he has any questions or develops heartburn again.            Thoracic History   Diagnosis: GERD  Procedures/Surgeries: 10/4/24 Robotic hiatal hernia repair with Allen  Pathology: N/a  Adjuvant Therapy: N/a  Problem   Gastroesophageal Reflux Disease Without Esophagitis        History of Present Illness   HPI  Jose Reyes is a 44 y.o. male who presents for follow-up after his robotic hiatal hernia repair with Allen fundoplication on 10/4/24. He was last seen in the office on 24.      From my consult note on 24: \"Jose Reyes is a 44 y.o. male referred for evaluation of GERD. His medical history only includes a knee surgery and sinus surgery. He states that he has had reflux pain for at least a few years. The medications used to help but no longer help. He continues to complain of severe substernal pain. He sleeps with pillows but still has reflux pain. Limits eating before bed. He has no dysphagia but sometimes has nausea. He has an occasional cough. \"     From 10/23/24 Office: Surgery went well and he is recovering very well. No incisional pain after surgery. Soft food is going down fine when he takes his time eating. When he eats faster things feel like they get stuck in the cervical esophagus. No nausea or vomiting. Has tried a lot of different foods without restriction and is back on his own regular diet. He is now sleeping through the night without heartburn. No heartburn " "with eating or during the day. He is not taking his Aciphex.      From 11/27/24: Continues to improve. Minimal rare surgical pain. Rarely has any feeling of dysphagia. No heartburn pain remains off of Aciphex.     Today:   Diet: No dysphagia.   GERD: Some mild intermittent neck burning. Nothing in the chest.   Pain: No surgical pain.  Weight: Weight is stable.       Review of Systems   Constitutional:  Negative for activity change, appetite change, chills, fever and unexpected weight change.   HENT:  Negative for trouble swallowing and voice change.    Eyes:  Negative for photophobia and visual disturbance.   Respiratory:  Negative for chest tightness and shortness of breath.    Cardiovascular:  Negative for chest pain and palpitations.   Gastrointestinal:  Negative for abdominal pain, nausea and vomiting.   Musculoskeletal:  Negative for back pain and gait problem.   Neurological:  Negative for dizziness, weakness and numbness.           Objective   /66 (BP Location: Left arm, Patient Position: Sitting, Cuff Size: Adult)   Pulse 93   Temp 98.3 °F (36.8 °C) (Temporal)   Resp 18   Ht 5' 5\" (1.651 m)   Wt 73 kg (161 lb)   SpO2 96%   BMI 26.79 kg/m²     Pain Screening:  Pain Score: 0-No pain  ECOG    Physical Exam  Constitutional:       Appearance: Normal appearance. He is normal weight.   HENT:      Head: Normocephalic and atraumatic.   Cardiovascular:      Rate and Rhythm: Normal rate and regular rhythm.      Pulses: Normal pulses.      Heart sounds: Normal heart sounds.   Pulmonary:      Effort: No respiratory distress.      Breath sounds: Normal breath sounds.   Abdominal:      General: Abdomen is flat. There is no distension.      Palpations: Abdomen is soft.      Tenderness: There is no abdominal tenderness.   Musculoskeletal:      Right lower leg: No edema.      Left lower leg: No edema.   Skin:     Comments: Well healed abdominal incisions.    Neurological:      General: No focal deficit present. "      Mental Status: He is alert and oriented to person, place, and time.   Psychiatric:         Mood and Affect: Mood normal.         Labs:       Pathology: I have reviewed pathology reports described above.

## 2025-04-23 ENCOUNTER — OFFICE VISIT (OUTPATIENT)
Dept: CARDIAC SURGERY | Facility: HOSPITAL | Age: 45
End: 2025-04-23
Payer: COMMERCIAL

## 2025-04-23 VITALS
SYSTOLIC BLOOD PRESSURE: 124 MMHG | TEMPERATURE: 98.3 F | HEIGHT: 65 IN | DIASTOLIC BLOOD PRESSURE: 66 MMHG | RESPIRATION RATE: 18 BRPM | OXYGEN SATURATION: 96 % | BODY MASS INDEX: 26.82 KG/M2 | HEART RATE: 93 BPM | WEIGHT: 161 LBS

## 2025-04-23 DIAGNOSIS — K21.9 GASTROESOPHAGEAL REFLUX DISEASE WITHOUT ESOPHAGITIS: Primary | ICD-10-CM

## 2025-04-23 PROCEDURE — 99213 OFFICE O/P EST LOW 20 MIN: CPT | Performed by: SURGERY

## (undated) DEVICE — ARM DRAPE

## (undated) DEVICE — Device: Brand: DEFENDO AIR/WATER/SUCTION AND BIOPSY VALVE

## (undated) DEVICE — GLOVE SRG BIOGEL ECLIPSE 7.5

## (undated) DEVICE — TIP-UP FENESTRATED GRASPER: Brand: ENDOWRIST

## (undated) DEVICE — PENROSE DRAIN, 18 X 3 8: Brand: CARDINAL HEALTH

## (undated) DEVICE — SUT ETHIBOND 2-0 SH/SH 36 IN X523H

## (undated) DEVICE — NEEDLE HYPO 23G X 1-1/2 IN

## (undated) DEVICE — SUT ETHIBOND 0 SH 30 IN X834H

## (undated) DEVICE — TUBING SUCTION 5MM X 12 FT

## (undated) DEVICE — ANTIBACTERIAL UNDYED BRAIDED (POLYGLACTIN 910), SYNTHETIC ABSORBABLE SUTURE: Brand: COATED VICRYL

## (undated) DEVICE — 60 ML SYRINGE,REGULAR TIP: Brand: MONOJECT

## (undated) DEVICE — CANNULA SEAL

## (undated) DEVICE — VESSEL SEALER EXTEND: Brand: ENDOWRIST

## (undated) DEVICE — BLADELESS OBTURATOR: Brand: WECK VISTA

## (undated) DEVICE — UTILITY MARKER,BLACK WITH LABELS: Brand: DEVON

## (undated) DEVICE — VISUALIZATION SYSTEM: Brand: CLEARIFY

## (undated) DEVICE — COLUMN DRAPE

## (undated) DEVICE — CADIERE FORCEPS: Brand: ENDOWRIST

## (undated) DEVICE — TROCARS: Brand: KII® OPTICAL ACCESS SYSTEM

## (undated) DEVICE — INSUFFLATION NEEDLE TO ESTABLISH PNEUMOPERITONEUM.: Brand: INSUFFLATION NEEDLE

## (undated) DEVICE — AIR AND WATER TUBING/CAP SET FOR OLYMPUS® SCOPES: Brand: ERBE

## (undated) DEVICE — SUT MONOCRYL 4-0 PS-2 18 IN Y496G

## (undated) DEVICE — KIT, BETHLEHEM THORACIC ROBOT: Brand: CARDINAL HEALTH

## (undated) DEVICE — INTENDED FOR TISSUE SEPARATION, AND OTHER PROCEDURES THAT REQUIRE A SHARP SURGICAL BLADE TO PUNCTURE OR CUT.: Brand: BARD-PARKER SAFETY BLADES SIZE 11, STERILE

## (undated) DEVICE — MEGA SUTURECUT ND: Brand: ENDOWRIST

## (undated) DEVICE — GAUZE SPONGES,16 PLY: Brand: CURITY

## (undated) DEVICE — EXOFIN PRECISION PEN HIGH VISCOSITY TOPICAL SKIN ADHESIVE: Brand: EXOFIN PRECISION PEN, 1G

## (undated) DEVICE — FIRST STEP BEDSIDE KIT - STAND-UP POUCH, ENDOSCOPIC CLEANING PAD - 1 POUCH: Brand: FIRST STEP BEDSIDE KIT - STAND-UP POUCH, ENDOSCOPIC CLEANING PAD